# Patient Record
Sex: MALE | Race: WHITE | URBAN - METROPOLITAN AREA
[De-identification: names, ages, dates, MRNs, and addresses within clinical notes are randomized per-mention and may not be internally consistent; named-entity substitution may affect disease eponyms.]

---

## 2020-02-01 ENCOUNTER — OUTPATIENT (OUTPATIENT)
Dept: OUTPATIENT SERVICES | Facility: HOSPITAL | Age: 60
LOS: 1 days | End: 2020-02-01
Payer: MEDICARE

## 2020-02-01 PROCEDURE — G9005: CPT

## 2020-02-05 ENCOUNTER — EMERGENCY (EMERGENCY)
Facility: HOSPITAL | Age: 60
LOS: 0 days | Discharge: HOME | End: 2020-02-05
Attending: EMERGENCY MEDICINE
Payer: MEDICARE

## 2020-02-05 VITALS
OXYGEN SATURATION: 98 % | HEART RATE: 89 BPM | RESPIRATION RATE: 17 BRPM | DIASTOLIC BLOOD PRESSURE: 90 MMHG | TEMPERATURE: 98 F | SYSTOLIC BLOOD PRESSURE: 145 MMHG

## 2020-02-05 VITALS
SYSTOLIC BLOOD PRESSURE: 168 MMHG | RESPIRATION RATE: 18 BRPM | WEIGHT: 257.06 LBS | DIASTOLIC BLOOD PRESSURE: 108 MMHG | HEART RATE: 106 BPM | TEMPERATURE: 98 F | OXYGEN SATURATION: 97 %

## 2020-02-05 DIAGNOSIS — R45.851 SUICIDAL IDEATIONS: ICD-10-CM

## 2020-02-05 DIAGNOSIS — F43.29 ADJUSTMENT DISORDER WITH OTHER SYMPTOMS: ICD-10-CM

## 2020-02-05 DIAGNOSIS — F25.9 SCHIZOAFFECTIVE DISORDER, UNSPECIFIED: ICD-10-CM

## 2020-02-05 DIAGNOSIS — I10 ESSENTIAL (PRIMARY) HYPERTENSION: ICD-10-CM

## 2020-02-05 DIAGNOSIS — E11.65 TYPE 2 DIABETES MELLITUS WITH HYPERGLYCEMIA: ICD-10-CM

## 2020-02-05 LAB
ALBUMIN SERPL ELPH-MCNC: 3.2 G/DL — LOW (ref 3.3–5)
ALP SERPL-CCNC: 77 U/L — SIGNIFICANT CHANGE UP (ref 40–120)
ALT FLD-CCNC: 29 U/L — SIGNIFICANT CHANGE UP (ref 12–78)
ANION GAP SERPL CALC-SCNC: 9 MMOL/L — SIGNIFICANT CHANGE UP (ref 5–17)
APAP SERPL-MCNC: SIGNIFICANT CHANGE UP UG/ML (ref 10–30)
AST SERPL-CCNC: 24 U/L — SIGNIFICANT CHANGE UP (ref 15–37)
BASOPHILS # BLD AUTO: 0.09 K/UL — SIGNIFICANT CHANGE UP (ref 0–0.2)
BASOPHILS NFR BLD AUTO: 1.3 % — SIGNIFICANT CHANGE UP (ref 0–2)
BILIRUB SERPL-MCNC: 0.4 MG/DL — SIGNIFICANT CHANGE UP (ref 0.2–1.2)
BUN SERPL-MCNC: 14 MG/DL — SIGNIFICANT CHANGE UP (ref 7–23)
CALCIUM SERPL-MCNC: 9.2 MG/DL — SIGNIFICANT CHANGE UP (ref 8.5–10.1)
CHLORIDE SERPL-SCNC: 99 MMOL/L — SIGNIFICANT CHANGE UP (ref 96–108)
CO2 SERPL-SCNC: 25 MMOL/L — SIGNIFICANT CHANGE UP (ref 22–31)
CREAT SERPL-MCNC: 1.2 MG/DL — SIGNIFICANT CHANGE UP (ref 0.5–1.3)
EOSINOPHIL # BLD AUTO: 0.5 K/UL — SIGNIFICANT CHANGE UP (ref 0–0.5)
EOSINOPHIL NFR BLD AUTO: 7.3 % — HIGH (ref 0–6)
ETHANOL SERPL-MCNC: <10 MG/DL — SIGNIFICANT CHANGE UP (ref 0–10)
GLUCOSE SERPL-MCNC: 456 MG/DL — CRITICAL HIGH (ref 70–99)
HCO3 BLDV-SCNC: 25 MMOL/L — SIGNIFICANT CHANGE UP (ref 21–29)
HCT VFR BLD CALC: 38.5 % — LOW (ref 39–50)
HGB BLD-MCNC: 13 G/DL — SIGNIFICANT CHANGE UP (ref 13–17)
IMM GRANULOCYTES NFR BLD AUTO: 0.7 % — SIGNIFICANT CHANGE UP (ref 0–1.5)
LYMPHOCYTES # BLD AUTO: 0.95 K/UL — LOW (ref 1–3.3)
LYMPHOCYTES # BLD AUTO: 14 % — SIGNIFICANT CHANGE UP (ref 13–44)
MCHC RBC-ENTMCNC: 28.5 PG — SIGNIFICANT CHANGE UP (ref 27–34)
MCHC RBC-ENTMCNC: 33.8 GM/DL — SIGNIFICANT CHANGE UP (ref 32–36)
MCV RBC AUTO: 84.4 FL — SIGNIFICANT CHANGE UP (ref 80–100)
MONOCYTES # BLD AUTO: 0.46 K/UL — SIGNIFICANT CHANGE UP (ref 0–0.9)
MONOCYTES NFR BLD AUTO: 6.8 % — SIGNIFICANT CHANGE UP (ref 2–14)
NEUTROPHILS # BLD AUTO: 4.76 K/UL — SIGNIFICANT CHANGE UP (ref 1.8–7.4)
NEUTROPHILS NFR BLD AUTO: 69.9 % — SIGNIFICANT CHANGE UP (ref 43–77)
PCO2 BLDV: 47 MMHG — SIGNIFICANT CHANGE UP (ref 35–50)
PCP SPEC-MCNC: SIGNIFICANT CHANGE UP
PH BLDV: 7.36 — SIGNIFICANT CHANGE UP (ref 7.35–7.45)
PLATELET # BLD AUTO: 210 K/UL — SIGNIFICANT CHANGE UP (ref 150–400)
PO2 BLDV: 57 MMHG — HIGH (ref 25–45)
POTASSIUM SERPL-MCNC: 4.1 MMOL/L — SIGNIFICANT CHANGE UP (ref 3.5–5.3)
POTASSIUM SERPL-SCNC: 4.1 MMOL/L — SIGNIFICANT CHANGE UP (ref 3.5–5.3)
PROT SERPL-MCNC: 7.5 GM/DL — SIGNIFICANT CHANGE UP (ref 6–8.3)
RBC # BLD: 4.56 M/UL — SIGNIFICANT CHANGE UP (ref 4.2–5.8)
RBC # FLD: 12.8 % — SIGNIFICANT CHANGE UP (ref 10.3–14.5)
SALICYLATES SERPL-MCNC: 3.1 MG/DL — SIGNIFICANT CHANGE UP (ref 2.8–20)
SAO2 % BLDV: 87 % — SIGNIFICANT CHANGE UP (ref 67–88)
SODIUM SERPL-SCNC: 133 MMOL/L — LOW (ref 135–145)
WBC # BLD: 6.81 K/UL — SIGNIFICANT CHANGE UP (ref 3.8–10.5)
WBC # FLD AUTO: 6.81 K/UL — SIGNIFICANT CHANGE UP (ref 3.8–10.5)

## 2020-02-05 PROCEDURE — 80307 DRUG TEST PRSMV CHEM ANLYZR: CPT

## 2020-02-05 PROCEDURE — 90792 PSYCH DIAG EVAL W/MED SRVCS: CPT

## 2020-02-05 PROCEDURE — 82962 GLUCOSE BLOOD TEST: CPT

## 2020-02-05 PROCEDURE — 85025 COMPLETE CBC W/AUTO DIFF WBC: CPT

## 2020-02-05 PROCEDURE — 93005 ELECTROCARDIOGRAM TRACING: CPT

## 2020-02-05 PROCEDURE — 36415 COLL VENOUS BLD VENIPUNCTURE: CPT

## 2020-02-05 PROCEDURE — 93010 ELECTROCARDIOGRAM REPORT: CPT

## 2020-02-05 PROCEDURE — 82803 BLOOD GASES ANY COMBINATION: CPT

## 2020-02-05 PROCEDURE — 99285 EMERGENCY DEPT VISIT HI MDM: CPT

## 2020-02-05 PROCEDURE — 80053 COMPREHEN METABOLIC PANEL: CPT

## 2020-02-05 PROCEDURE — 99283 EMERGENCY DEPT VISIT LOW MDM: CPT

## 2020-02-05 RX ORDER — SODIUM CHLORIDE 9 MG/ML
2000 INJECTION INTRAMUSCULAR; INTRAVENOUS; SUBCUTANEOUS ONCE
Refills: 0 | Status: COMPLETED | OUTPATIENT
Start: 2020-02-05 | End: 2020-02-05

## 2020-02-05 RX ORDER — INSULIN LISPRO 100/ML
6 VIAL (ML) SUBCUTANEOUS ONCE
Refills: 0 | Status: COMPLETED | OUTPATIENT
Start: 2020-02-05 | End: 2020-02-05

## 2020-02-05 RX ADMIN — Medication 6 UNIT(S): at 12:27

## 2020-02-05 RX ADMIN — SODIUM CHLORIDE 2000 MILLILITER(S): 9 INJECTION INTRAMUSCULAR; INTRAVENOUS; SUBCUTANEOUS at 11:00

## 2020-02-05 NOTE — ED PROVIDER NOTE - PATIENT PORTAL LINK FT
You can access the FollowMyHealth Patient Portal offered by [facility] by registering at the following website: [email or web address]. By joining [employer]’s FollowMyHealth portal, you will also be able to view your health information using other applications (apps) compatible with our system.

## 2020-02-05 NOTE — ED BEHAVIORAL HEALTH ASSESSMENT NOTE - SUICIDE PROTECTIVE FACTORS
Supportive social network of family or friends/Identifies reasons for living/Responsibility to family and others/Has future plans

## 2020-02-05 NOTE — ED BEHAVIORAL HEALTH ASSESSMENT NOTE - DIFFERENTIAL
schizoaffective, acute exacerbation v Major Depressive Disorder v Adjustment reaction with depressed mood
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
## 2020-02-05 NOTE — ED PROVIDER NOTE - CLINICAL SUMMARY MEDICAL DECISION MAKING FREE TEXT BOX
Pt here for SI, incidentally found to have finger stick in 400s. Plan: fluids, labs, r/o DKA, medically clear, psych consult.

## 2020-02-05 NOTE — ED BEHAVIORAL HEALTH NOTE - BEHAVIORAL HEALTH NOTE
After being informed by DION Molina that pt wanted to discuss outpt options with SW, SW met with pt at bedside. Per pt, his psychiatrist is retiring and he has approx. 3 weeks of BH meds left. SW discussed [facility] as option, and pt agreeable. Pt verbally consented to referral to [facility] for medication management and therapeutic services. SW also provided [facility] contact info as resource in the event that pt runs out of meds prior to seeing psychiatrist at [facility]. After obtaining verbal consent, SW made referral via fax. Appt made using [facility] for 2/11 at 1:30PM with Brenda Rhoades for intake. DION molina updated. MD updated. Np additional SW needs verbalized by pt at this time.

## 2020-02-05 NOTE — ED BEHAVIORAL HEALTH ASSESSMENT NOTE - DESCRIPTION
Patient was calm and cooperative in the ED and did not exhibit any aggression. Patient did not require any PRN medications or any physical restraints.    Vital Signs Last 24 Hrs  T(C): 36.6 (05 Feb 2020 10:18), Max: 36.6 (05 Feb 2020 10:18)  T(F): 97.8 (05 Feb 2020 10:18), Max: 97.8 (05 Feb 2020 10:18)  HR: 106 (05 Feb 2020 10:18) (106 - 106)  BP: 168/108 (05 Feb 2020 10:18) (168/108 - 168/108)  BP(mean): --  RR: 18 (05 Feb 2020 10:18) (18 - 18)  SpO2: 97% (05 Feb 2020 10:18) (97% - 97%) See ED Attending / Provider note As per HPI

## 2020-02-05 NOTE — ED BEHAVIORAL HEALTH ASSESSMENT NOTE - RISK ASSESSMENT
LOW RISK     ACUTE RISK FACTORS: interpersonal conflict with roommate, irritable,    CHRONIC RISK FACTORS: history of self-injurious behaviors, schizoaffective disorder, history of in-patient hospitalization,     PROTECTIVE FACTORS: no suicidal ideation/intent/plan or assault ideation/intent/plan or homicidal ideation/intent/plan , future oriented, engaged in safety planning     Patient symptoms not indicating imminent risk for harm to self; not warranting involuntary in-patient hospitalization Low Acute Suicide Risk

## 2020-02-05 NOTE — ED PROVIDER NOTE - NSFOLLOWUPINSTRUCTIONS_ED_ALL_ED_FT
Suicidal Feelings: How to Help Yourself  Suicide is when you end your own life. There are many things you can do to help yourself feel better when struggling with these feelings. Many services and people are available to support you and others who struggle with similar feelings.   If you ever feel like you may hurt yourself or others, or have thoughts about taking your own life, get help right away. To get help:   Call your local emergency services (911 in the [place] Go to your nearest emergency department. Call a suicide hotline to speak with a trained counselor. The following suicide hotlines are available in the [place]:  9-979-949-TALK (1-109.531.3135).9-180-TGPLCAI (1-174.986.1322).1-392.387.2154. This is a hotline for Cypriot speakers.1-326.613.9084. This is a hotline for TTY users.7-192-1-U-PARMINDER (1-557.537.1544). This is a hotline for lesbian, rincon, bisexual, transgender, or questioning youth.For a list of hotlines in [place], visit www.suicide.org/hotlines/international/gqgrgs-mhitvzw-qxcqkyvp.htmlContact a crisis center or a local suicide prevention center. To find a crisis center or suicide prevention center:  Call your local hospital, clinic, community service organization, mental health center, social service provider, or health department. Ask for help with connecting to a crisis center.For a list of crisis centers in the [place], visit: suicidepreventionlifeline.orgFor a list of crisis centers in [place], visit: suicideprevention.caHow to help yourself feel better     Promise yourself that you will not do anything extreme when you have suicidal feelings. Remember, there is hope. Many people have gotten through suicidal thoughts and feelings, and you can too. If you have had these feelings before, remind yourself that you can get through them again.Let family, friends, teachers, or counselors know how you are feeling. Try not to separate yourself from those who care about you and want to help you. Talk with someone every day, even if you do not feel sociable. Face-to-face conversation is best to help them understand your feelings.Contact a mental health care provider and work with this person regularly.Make a safety plan that you can follow during a crisis. Include phone numbers of suicide prevention hotlines, mental health professionals, and trusted friends and family members you can call during an emergency. Save these numbers on your phone.If you are thinking of taking a lot of medicine, give your medicine to someone who can give it to you as prescribed. If you are on antidepressants and are concerned you will overdose, tell your health care provider so that he or she can give you safer medicines.Try to stick to your routines. Follow a schedule every day. Make self-care a priority.Make a list of realistic goals, and cross them off when you achieve them. Accomplishments can give you a sense of worth.Wait until you are feeling better before doing things that you find difficult or unpleasant.Do things that you have always enjoyed to take your mind off your feelings. Try reading a book, or listening to or playing music. Spending time outside, in nature, may help you feel better.Follow these instructions at home:     Visit your primary health care provider every year for a checkup.Work with a mental health care provider as needed.Eat a well-balanced diet, and eat regular meals.Get plenty of rest.Exercise if you are able. Just 30 minutes of exercise each day can help you feel better.Take over-the-counter and prescription medicines only as told by your health care provider. Ask your mental health care provider about the possible side effects of any medicines you are taking.Do not use alcohol or drugs, and remove these substances from your home.Remove weapons, poisons, knives, and other deadly items from your home.General recommendations  Keep your living space well lit.When you are feeling well, write yourself a letter with tips and support that you can read when you are not feeling well.Remember that life's difficulties can be sorted out with help. Conditions can be treated, and you can learn behaviors and ways of thinking that will help you.Where to find more information  National Suicide Prevention Lifeline: www.suicidepreventionlifeline.orgHopeline: www.hopeline.[facility] Foundation for Suicide Prevention: www.afsp.orgThe Parminder Project (for lesbian, rincon, bisexual, transgender, or questioning youth): www.thetrevorproject.orgContact a health care provider if:  You feel as though you are a burden to others.You feel agitated, angry, vengeful, or have extreme mood swings.You have withdrawn from family and friends.Get help right away if:  You are talking about suicide or wishing to die.You start making plans for how to commit suicide.You feel that you have no reason to live.You start making plans for putting your affairs in order, saying goodbye, or giving your possessions away.You feel guilt, shame, or unbearable pain, and it seems like there is no way out.You are frequently using drugs or alcohol.You are engaging in risky behaviors that could lead to death.If you have any of these symptoms, get help right away. Call emergency services, go to your nearest emergency department or crisis center, or call a suicide crisis helpline.   Summary  Suicide is when you take your own life.Promise yourself that you will not do anything extreme when you have suicidal feelings.Let family, friends, teachers, or counselors know how you are feeling.Get help right away if you feel as though life is getting too tough to handle and you are thinking about suicide.This information is not intended to replace advice given to you by your health care provider. Make sure you discuss any questions you have with your health care provider.    Document Released: 06/23/2004 Document Revised: 07/31/2018 Document Reviewed: 07/31/2018  [employer] Patient Education © 2019 [employer]

## 2020-02-05 NOTE — ED ADULT NURSE REASSESSMENT NOTE - NS ED NURSE REASSESS COMMENT FT1
Pt cleared by psychiatric NP Lonnie, pt awaiting social work consult. Pt medicated with insulin. Will continue to monitor for safety and comfort.
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
## 2020-02-05 NOTE — ED ADULT NURSE NOTE - OBJECTIVE STATEMENT
58 y/o M presents to the ED c/ SI, depression due to living situation. Pt states his roommate is very negative. Pt BIB [facility], calm and co-operative. No complaints of pain or discomfort.

## 2020-02-16 ENCOUNTER — INPATIENT (INPATIENT)
Facility: HOSPITAL | Age: 60
LOS: 7 days | Discharge: HOME | DRG: 885 | End: 2020-02-24
Attending: PSYCHIATRY & NEUROLOGY | Admitting: PSYCHIATRY & NEUROLOGY
Payer: MEDICARE

## 2020-02-16 VITALS
SYSTOLIC BLOOD PRESSURE: 158 MMHG | WEIGHT: 259.93 LBS | RESPIRATION RATE: 16 BRPM | HEIGHT: 73 IN | TEMPERATURE: 98 F | DIASTOLIC BLOOD PRESSURE: 86 MMHG

## 2020-02-16 DIAGNOSIS — R45.850 HOMICIDAL IDEATIONS: ICD-10-CM

## 2020-02-16 DIAGNOSIS — F25.0 SCHIZOAFFECTIVE DISORDER, BIPOLAR TYPE: ICD-10-CM

## 2020-02-16 PROBLEM — E11.9 TYPE 2 DIABETES MELLITUS WITHOUT COMPLICATIONS: Chronic | Status: ACTIVE | Noted: 2020-02-05

## 2020-02-16 PROBLEM — I10 ESSENTIAL (PRIMARY) HYPERTENSION: Chronic | Status: ACTIVE | Noted: 2020-02-05

## 2020-02-16 PROBLEM — F25.9 SCHIZOAFFECTIVE DISORDER, UNSPECIFIED: Chronic | Status: ACTIVE | Noted: 2020-02-05

## 2020-02-16 LAB
ALBUMIN SERPL ELPH-MCNC: 3.4 G/DL — SIGNIFICANT CHANGE UP (ref 3.3–5)
ALP SERPL-CCNC: 74 U/L — SIGNIFICANT CHANGE UP (ref 40–120)
ALT FLD-CCNC: 28 U/L — SIGNIFICANT CHANGE UP (ref 12–78)
ANION GAP SERPL CALC-SCNC: 6 MMOL/L — SIGNIFICANT CHANGE UP (ref 5–17)
APAP SERPL-MCNC: < 2 UG/ML (ref 10–30)
APPEARANCE UR: CLEAR — SIGNIFICANT CHANGE UP
AST SERPL-CCNC: 19 U/L — SIGNIFICANT CHANGE UP (ref 15–37)
BASOPHILS # BLD AUTO: 0.11 K/UL — SIGNIFICANT CHANGE UP (ref 0–0.2)
BASOPHILS NFR BLD AUTO: 1.5 % — SIGNIFICANT CHANGE UP (ref 0–2)
BILIRUB SERPL-MCNC: 0.5 MG/DL — SIGNIFICANT CHANGE UP (ref 0.2–1.2)
BILIRUB UR-MCNC: NEGATIVE — SIGNIFICANT CHANGE UP
BUN SERPL-MCNC: 16 MG/DL — SIGNIFICANT CHANGE UP (ref 7–23)
CALCIUM SERPL-MCNC: 8.7 MG/DL — SIGNIFICANT CHANGE UP (ref 8.5–10.1)
CHLORIDE SERPL-SCNC: 100 MMOL/L — SIGNIFICANT CHANGE UP (ref 96–108)
CO2 SERPL-SCNC: 25 MMOL/L — SIGNIFICANT CHANGE UP (ref 22–31)
COLOR SPEC: YELLOW — SIGNIFICANT CHANGE UP
CREAT SERPL-MCNC: 1.08 MG/DL — SIGNIFICANT CHANGE UP (ref 0.5–1.3)
DIFF PNL FLD: ABNORMAL
EOSINOPHIL # BLD AUTO: 0.6 K/UL — HIGH (ref 0–0.5)
EOSINOPHIL NFR BLD AUTO: 8 % — HIGH (ref 0–6)
ETHANOL SERPL-MCNC: <10 MG/DL — SIGNIFICANT CHANGE UP (ref 0–10)
GLUCOSE SERPL-MCNC: 400 MG/DL — HIGH (ref 70–99)
GLUCOSE UR QL: 1000 MG/DL
HCT VFR BLD CALC: 40.3 % — SIGNIFICANT CHANGE UP (ref 39–50)
HGB BLD-MCNC: 13.7 G/DL — SIGNIFICANT CHANGE UP (ref 13–17)
IMM GRANULOCYTES NFR BLD AUTO: 0.8 % — SIGNIFICANT CHANGE UP (ref 0–1.5)
KETONES UR-MCNC: NEGATIVE — SIGNIFICANT CHANGE UP
LEUKOCYTE ESTERASE UR-ACNC: NEGATIVE — SIGNIFICANT CHANGE UP
LIDOCAIN IGE QN: 170 U/L — SIGNIFICANT CHANGE UP (ref 73–393)
LYMPHOCYTES # BLD AUTO: 0.94 K/UL — LOW (ref 1–3.3)
LYMPHOCYTES # BLD AUTO: 12.6 % — LOW (ref 13–44)
MAGNESIUM SERPL-MCNC: 2.1 MG/DL — SIGNIFICANT CHANGE UP (ref 1.6–2.6)
MCHC RBC-ENTMCNC: 28.8 PG — SIGNIFICANT CHANGE UP (ref 27–34)
MCHC RBC-ENTMCNC: 34 GM/DL — SIGNIFICANT CHANGE UP (ref 32–36)
MCV RBC AUTO: 84.8 FL — SIGNIFICANT CHANGE UP (ref 80–100)
MONOCYTES # BLD AUTO: 0.65 K/UL — SIGNIFICANT CHANGE UP (ref 0–0.9)
MONOCYTES NFR BLD AUTO: 8.7 % — SIGNIFICANT CHANGE UP (ref 2–14)
NEUTROPHILS # BLD AUTO: 5.12 K/UL — SIGNIFICANT CHANGE UP (ref 1.8–7.4)
NEUTROPHILS NFR BLD AUTO: 68.4 % — SIGNIFICANT CHANGE UP (ref 43–77)
NITRITE UR-MCNC: NEGATIVE — SIGNIFICANT CHANGE UP
NT-PROBNP SERPL-SCNC: 50 PG/ML — SIGNIFICANT CHANGE UP (ref 0–125)
PH UR: 6 — SIGNIFICANT CHANGE UP (ref 5–8)
PLATELET # BLD AUTO: 241 K/UL — SIGNIFICANT CHANGE UP (ref 150–400)
POTASSIUM SERPL-MCNC: 4.8 MMOL/L — SIGNIFICANT CHANGE UP (ref 3.5–5.3)
POTASSIUM SERPL-SCNC: 4.8 MMOL/L — SIGNIFICANT CHANGE UP (ref 3.5–5.3)
PROT SERPL-MCNC: 7.5 GM/DL — SIGNIFICANT CHANGE UP (ref 6–8.3)
PROT UR-MCNC: 100 MG/DL
RBC # BLD: 4.75 M/UL — SIGNIFICANT CHANGE UP (ref 4.2–5.8)
RBC # FLD: 12.9 % — SIGNIFICANT CHANGE UP (ref 10.3–14.5)
SALICYLATES SERPL-MCNC: <1.7 MG/DL — LOW (ref 2.8–20)
SODIUM SERPL-SCNC: 131 MMOL/L — LOW (ref 135–145)
SP GR SPEC: 1.01 — SIGNIFICANT CHANGE UP (ref 1.01–1.02)
TROPONIN I SERPL-MCNC: <0.015 NG/ML — SIGNIFICANT CHANGE UP (ref 0.01–0.04)
TROPONIN I SERPL-MCNC: <0.015 NG/ML — SIGNIFICANT CHANGE UP (ref 0.01–0.04)
UROBILINOGEN FLD QL: NEGATIVE MG/DL — SIGNIFICANT CHANGE UP
WBC # BLD: 7.48 K/UL — SIGNIFICANT CHANGE UP (ref 3.8–10.5)
WBC # FLD AUTO: 7.48 K/UL — SIGNIFICANT CHANGE UP (ref 3.8–10.5)

## 2020-02-16 PROCEDURE — 83036 HEMOGLOBIN GLYCOSYLATED A1C: CPT

## 2020-02-16 PROCEDURE — 97162 PT EVAL MOD COMPLEX 30 MIN: CPT | Mod: GP

## 2020-02-16 PROCEDURE — 80061 LIPID PANEL: CPT

## 2020-02-16 PROCEDURE — 36415 COLL VENOUS BLD VENIPUNCTURE: CPT

## 2020-02-16 PROCEDURE — 97116 GAIT TRAINING THERAPY: CPT | Mod: GP

## 2020-02-16 PROCEDURE — 80048 BASIC METABOLIC PNL TOTAL CA: CPT

## 2020-02-16 PROCEDURE — 99221 1ST HOSP IP/OBS SF/LOW 40: CPT

## 2020-02-16 PROCEDURE — 93010 ELECTROCARDIOGRAM REPORT: CPT

## 2020-02-16 PROCEDURE — 82962 GLUCOSE BLOOD TEST: CPT

## 2020-02-16 PROCEDURE — 71045 X-RAY EXAM CHEST 1 VIEW: CPT | Mod: 26

## 2020-02-16 RX ORDER — HALOPERIDOL DECANOATE 100 MG/ML
5 INJECTION INTRAMUSCULAR EVERY 6 HOURS
Refills: 0 | Status: DISCONTINUED | OUTPATIENT
Start: 2020-02-16 | End: 2020-02-24

## 2020-02-16 RX ORDER — DEXTROSE 50 % IN WATER 50 %
15 SYRINGE (ML) INTRAVENOUS ONCE
Refills: 0 | Status: DISCONTINUED | OUTPATIENT
Start: 2020-02-16 | End: 2020-02-24

## 2020-02-16 RX ORDER — ATORVASTATIN CALCIUM 80 MG/1
40 TABLET, FILM COATED ORAL AT BEDTIME
Refills: 0 | Status: DISCONTINUED | OUTPATIENT
Start: 2020-02-16 | End: 2020-02-24

## 2020-02-16 RX ORDER — DEXTROSE 50 % IN WATER 50 %
25 SYRINGE (ML) INTRAVENOUS ONCE
Refills: 0 | Status: DISCONTINUED | OUTPATIENT
Start: 2020-02-16 | End: 2020-02-24

## 2020-02-16 RX ORDER — DEXTROSE 50 % IN WATER 50 %
12.5 SYRINGE (ML) INTRAVENOUS ONCE
Refills: 0 | Status: DISCONTINUED | OUTPATIENT
Start: 2020-02-16 | End: 2020-02-24

## 2020-02-16 RX ORDER — DIPHENHYDRAMINE HCL 50 MG
25 CAPSULE ORAL EVERY 6 HOURS
Refills: 0 | Status: DISCONTINUED | OUTPATIENT
Start: 2020-02-16 | End: 2020-02-24

## 2020-02-16 RX ORDER — VENLAFAXINE HCL 75 MG
150 CAPSULE, EXT RELEASE 24 HR ORAL DAILY
Refills: 0 | Status: DISCONTINUED | OUTPATIENT
Start: 2020-02-16 | End: 2020-02-24

## 2020-02-16 RX ORDER — GLUCAGON INJECTION, SOLUTION 0.5 MG/.1ML
1 INJECTION, SOLUTION SUBCUTANEOUS ONCE
Refills: 0 | Status: DISCONTINUED | OUTPATIENT
Start: 2020-02-16 | End: 2020-02-24

## 2020-02-16 RX ORDER — OLANZAPINE 15 MG/1
10 TABLET, FILM COATED ORAL AT BEDTIME
Refills: 0 | Status: DISCONTINUED | OUTPATIENT
Start: 2020-02-16 | End: 2020-02-17

## 2020-02-16 RX ORDER — INSULIN GLARGINE 100 [IU]/ML
80 INJECTION, SOLUTION SUBCUTANEOUS AT BEDTIME
Refills: 0 | Status: DISCONTINUED | OUTPATIENT
Start: 2020-02-16 | End: 2020-02-18

## 2020-02-16 RX ORDER — SODIUM CHLORIDE 9 MG/ML
1000 INJECTION, SOLUTION INTRAVENOUS
Refills: 0 | Status: DISCONTINUED | OUTPATIENT
Start: 2020-02-16 | End: 2020-02-24

## 2020-02-16 RX ORDER — METOPROLOL TARTRATE 50 MG
25 TABLET ORAL
Refills: 0 | Status: DISCONTINUED | OUTPATIENT
Start: 2020-02-16 | End: 2020-02-24

## 2020-02-16 RX ORDER — INSULIN LISPRO 100/ML
VIAL (ML) SUBCUTANEOUS
Refills: 0 | Status: DISCONTINUED | OUTPATIENT
Start: 2020-02-16 | End: 2020-02-24

## 2020-02-16 RX ORDER — INSULIN LISPRO 100/ML
VIAL (ML) SUBCUTANEOUS AT BEDTIME
Refills: 0 | Status: DISCONTINUED | OUTPATIENT
Start: 2020-02-16 | End: 2020-02-24

## 2020-02-16 RX ORDER — GABAPENTIN 400 MG/1
100 CAPSULE ORAL THREE TIMES A DAY
Refills: 0 | Status: DISCONTINUED | OUTPATIENT
Start: 2020-02-16 | End: 2020-02-24

## 2020-02-16 RX ORDER — ASPIRIN/CALCIUM CARB/MAGNESIUM 324 MG
324 TABLET ORAL ONCE
Refills: 0 | Status: COMPLETED | OUTPATIENT
Start: 2020-02-16 | End: 2020-02-16

## 2020-02-16 RX ADMIN — Medication 25 MILLIGRAM(S): at 21:49

## 2020-02-16 RX ADMIN — Medication 4: at 21:47

## 2020-02-16 RX ADMIN — ATORVASTATIN CALCIUM 40 MILLIGRAM(S): 80 TABLET, FILM COATED ORAL at 21:49

## 2020-02-16 RX ADMIN — Medication 324 MILLIGRAM(S): at 09:36

## 2020-02-16 RX ADMIN — OLANZAPINE 10 MILLIGRAM(S): 15 TABLET, FILM COATED ORAL at 21:49

## 2020-02-16 RX ADMIN — GABAPENTIN 100 MILLIGRAM(S): 400 CAPSULE ORAL at 21:49

## 2020-02-16 RX ADMIN — INSULIN GLARGINE 80 UNIT(S): 100 INJECTION, SOLUTION SUBCUTANEOUS at 21:46

## 2020-02-16 NOTE — ED PROVIDER NOTE - NS ED ROS FT
Constitutional: No fever or chills  Eyes: No visual changes  HEENT: No throat pain  CV: + chest pain  Resp: No SOB no cough  GI: No abd pain, nausea or vomiting  : No dysuria  MSK: No musculoskeletal pain  Skin: No rash  Neuro: No headache   psych: SI/HI

## 2020-02-16 NOTE — ED ADULT NURSE NOTE - CHPI ED NUR SYMPTOMS NEG
no vomiting/no shortness of breath/no congestion/no dizziness/no syncope/no diaphoresis/no back pain/no fever/no chills/no nausea
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
## 2020-02-16 NOTE — ED PROVIDER NOTE - PHYSICAL EXAMINATION
Constitutional: NAD AAOx3  Eyes: PERRLA EOMI  Head: Normocephalic atraumatic  Mouth: MMM  Cardiac: regular rate   Resp: Lungs CTAB  GI: Abd s/nt/nd  Neuro: CN2-12 intact  Skin: No rashes   psych: SI HI

## 2020-02-16 NOTE — ED BEHAVIORAL HEALTH ASSESSMENT NOTE - RISK ASSESSMENT
HIGH RISK:     ACUTE RISK FACTORS: interpersonal conflict with roommate, irritable, current homicidal ideations with a plan to buy a gun to shoot a peer. Unable  to engaged in safety planning.    CHRONIC RISK FACTORS: history of self-injurious behaviors, schizoaffective disorder, history of in-patient hospitalization. Not future oriented, current hopelessness.    PROTECTIVE FACTORS: no current suicidal ideation/intent/plan.     Patient symptoms are indicative of imminent risk for harm his peer; warranting involuntary in-patient hospitalization. High Acute Suicide Risk HIGH RISK:   ACUTE RISK FACTORS: interpersonal conflict with roommate, irritable, current homicidal ideations with a plan to buy a gun to shoot a peer. Unable  to engaged in safety planning.    CHRONIC RISK FACTORS: history of self-injurious behaviors, schizoaffective disorder, history of in-patient hospitalization. Not future oriented, current hopelessness.    PROTECTIVE FACTORS: no current suicidal ideation/intent/plan.     Patient symptoms are indicative of imminent risk for harm his peer; warranting involuntary in-patient hospitalization.
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
## 2020-02-16 NOTE — CONSULT NOTE ADULT - ASSESSMENT
59M hx htn hld dm cad schizoaffective disorder presents to the ED for chest pressure on and off for the past few days.troponin x2 negative in ED, patient was medically cleared By ED for psych admission    # 59M hx htn hld dm cad schizoaffective disorder presents to the ED for chest pressure on and off for the past few days.troponin x2 negative in ED, patient was medically cleared By ED for psych admission    #Schizoaffective disorder /suical ideation  management per psych     #CAD  s/patypical  CP likely anxiety related /unlikley cardiac etiology  EKG NSR RBBB unchanged since feb 5th 2020, troponinx2 negative, no cardiac symptoms at this time   continue asa, (crestor not on formulary), start atorvastatin instead  continue metoprolol    # hx HTN controlled  # hx HLD  continue home meds as above    #hx DM uncontrolled FSBG 400  Not in DKA  resume lantus 80units home dose  insulin sliding scale     # DVT prophylaxis  ambulation    thank you for the courtesy of this consult

## 2020-02-16 NOTE — ED BEHAVIORAL HEALTH ASSESSMENT NOTE - DESCRIPTION
Calm and cooperative in the ED; no aggressive outbursts noted or reported by ED staff. No PRN medications or physical restraints required.    Vital Signs Last 24 Hrs  T(C): 36.8 (16 Feb 2020 08:16), Max: 36.8 (16 Feb 2020 08:16)  T(F): 98.2 (16 Feb 2020 08:16), Max: 98.2 (16 Feb 2020 08:16)  HR: --  BP: 158/86 (16 Feb 2020 08:16) (158/86 - 158/86)  BP(mean): --  RR: 16 (16 Feb 2020 08:16) (16 - 16)  SpO2: -- See ED Attending / Provider note As per HPI
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
## 2020-02-16 NOTE — ED ADULT NURSE REASSESSMENT NOTE - NS ED NURSE REASSESS COMMENT FT1
pt. stated to PCA that he had thoughts of cutting his wrists, DION Stinson made aware, report given to 5N, safety maintained.

## 2020-02-16 NOTE — PATIENT PROFILE BEHAVIORAL HEALTH - VISION (WITH CORRECTIVE LENSES IF THE PATIENT USUALLY WEARS THEM):
use glasses for reading./Normal vision: sees adequately in most situations; can see medication labels, newsprint use glasses for reading. (not prescription)/Partially impaired: cannot see medication labels or newsprint, but can see obstacles in path, and the surrounding layout; can count fingers at arm's length

## 2020-02-16 NOTE — CONSULT NOTE ADULT - SUBJECTIVE AND OBJECTIVE BOX
59M hx htn hld dm cad schizoaffective disorder presents to the ED for chest pressure on and off for the past few days. substernal comes and goes non-radiating. no f/c/sob/abd pain/n/v/diarrhea. non-exertional. no sweating.  . Pt also  states that his roommate has been bothering him lately - this has been making him agitated and over the past few days started having chest pressure. pt also stated he has been having suicidal ideations. Patient was medically cleared By ED for psych admission  patient admitted to psych for schizoaffective disorder and for concern of  harm to self or other    patient at this time denies any CP or SOB or abd pain or nausea or vomiting, no fever or cough or diarrhea     Pmhx see HPI  pshx none  home meds reveiwed  social hx denies recereational drug use , nonsmoker  family hx      Constitutional: NAD AAOx3  	Eyes: PERRLA EOMI  	Head: Normocephalic atraumatic  	Mouth: MMM  	Cardiac: regular rate   	Resp: Lungs CTAB  	GI: Abd s/nt/nd  	Neuro: CN2-12 intact  	Skin: No rashes 59M hx htn hld dm cad schizoaffective disorder presents to the ED for chest pressure on and off for the past few days. substernal comes and goes non-radiating. no f/c/sob/abd pain/n/v/diarrhea. non-exertional. no sweating.  . Pt also  states that his roommate has been bothering him lately - this has been making him agitated and over the past few days started having chest pressure. pt also stated he has been having suicidal ideations. Patient was medically cleared By ED for psych admission  patient admitted to psych for schizoaffective disorder and for concern of  harm to self or other    patient at this time denies any CP or SOB or abd pain or nausea or vomiting, no fever or cough or diarrhea     Pmhx see HPI  pshx none  home meds reveiwed  social hx denies recereational drug use , nonsmoker  family hx      Constitutional: NAD AAOx3  	Eyes: PERRLA EOMI  	Head: Normocephalic atraumatic  	Mouth: MMM  	Cardiac: regular rate   	Resp: Lungs CTAB  	GI: Abd s/nt/nd  	Neuro: CN2-12 intact  	Skin: No rashes         PHYSICAL EXAM:    Daily Height in cm: 185.42 (2020 16:36)    Daily Weight in k.4 (2020 16:36)    ICU Vital Signs Last 24 Hrs  T(C): 37.2 (2020 16:36), Max: 37.2 (2020 16:36)  T(F): 98.9 (2020 16:36), Max: 98.9 (2020 16:36)  HR: 86 (2020 15:09) (81 - 86)  BP: 142/84 (2020 15:09) (142/84 - 158/86)  BP(mean): 98 (2020 15:09) (98 - 98)  ABP: --  ABP(mean): --  RR: 16 (2020 16:36) (16 - 18)  SpO2: 94% (2020 15:09) (94% - 100%)                              13.7   7.48  )-----------( 241      ( 2020 09:15 )             40.3       CBC Full  -  ( 2020 09:15 )  WBC Count : 7.48 K/uL  RBC Count : 4.75 M/uL  Hemoglobin : 13.7 g/dL  Hematocrit : 40.3 %  Platelet Count - Automated : 241 K/uL  Mean Cell Volume : 84.8 fl  Mean Cell Hemoglobin : 28.8 pg  Mean Cell Hemoglobin Concentration : 34.0 gm/dL  Auto Neutrophil # : 5.12 K/uL  Auto Lymphocyte # : 0.94 K/uL  Auto Monocyte # : 0.65 K/uL  Auto Eosinophil # : 0.60 K/uL  Auto Basophil # : 0.11 K/uL  Auto Neutrophil % : 68.4 %  Auto Lymphocyte % : 12.6 %  Auto Monocyte % : 8.7 %  Auto Eosinophil % : 8.0 %  Auto Basophil % : 1.5 %      16    131<L>  |  100  |  16  ----------------------------<  400<H>  4.8   |  25  |  1.08    Ca    8.7      2020 09:15  Mg     2.1     -    TPro  7.5  /  Alb  3.4  /  TBili  0.5  /  DBili  x   /  AST  19  /  ALT  28  /  AlkPhos  74  02-16      LIVER FUNCTIONS - ( 2020 09:15 )  Alb: 3.4 g/dL / Pro: 7.5 gm/dL / ALK PHOS: 74 U/L / ALT: 28 U/L / AST: 19 U/L / GGT: x                 CARDIAC MARKERS ( 2020 11:44 )  <0.015 ng/mL / x     / x     / x     / x      CARDIAC MARKERS ( 2020 09:15 )  <0.015 ng/mL / x     / x     / x     / x            Urinalysis Basic - ( 2020 09:44 )    Color: Yellow / Appearance: Clear / S.010 / pH: x  Gluc: x / Ketone: Negative  / Bili: Negative / Urobili: Negative mg/dL   Blood: x / Protein: 100 mg/dL / Nitrite: Negative   Leuk Esterase: Negative / RBC: 6-10 /HPF / WBC 0-2   Sq Epi: x / Non Sq Epi: Occasional / Bacteria: Occasional            MEDICATIONS  (STANDING):  atorvastatin 40 milliGRAM(s) Oral at bedtime  gabapentin 100 milliGRAM(s) Oral three times a day  metoprolol succinate ER 25 milliGRAM(s) Oral two times a day  OLANZapine 10 milliGRAM(s) Oral at bedtime  venlafaxine  milliGRAM(s) Oral daily 59M hx htn hld dm cad schizoaffective disorder presents to the ED for chest pressure on and off for the past few days. substernal comes and goes non-radiating. no f/c/sob/abd pain/n/v/diarrhea. non-exertional. no sweating.  . Pt also  states that his roommate has been bothering him lately - this has been making him agitated and over the past few days started having chest pressure. pt also stated he has been having suicidal ideations. Patient was medically cleared By ED for psych admission  patient admitted to psych for schizoaffective disorder and for concern of  harm to self or other    patient at this time denies any CP or SOB or abd pain or nausea or vomiting, no fever or cough or diarrhea     Pmhx see HPI  pshx none  home meds reveiwed  social hx denies recereational drug use , nonsmoker, denies etoh use  family hx- denies family hx of cardiac disease      Constitutional: NAD AAOx3  	Eyes: PERRLA EOMI  	Head: Normocephalic atraumatic  	Mouth: MMM  	Cardiac: regular rate   	Resp: Lungs CTAB  	GI: Abd s/nt/nd  	Neuro: CN2-12 intact  	Skin: No rashes         PHYSICAL EXAM:    Daily Height in cm: 185.42 (2020 16:36)    Daily Weight in k.4 (2020 16:36)    ICU Vital Signs Last 24 Hrs  T(C): 37.2 (2020 16:36), Max: 37.2 (2020 16:36)  T(F): 98.9 (2020 16:36), Max: 98.9 (2020 16:36)  HR: 86 (2020 15:09) (81 - 86)  BP: 142/84 (2020 15:09) (142/84 - 158/86)  BP(mean): 98 (2020 15:09) (98 - 98)  ABP: --  ABP(mean): --  RR: 16 (2020 16:36) (16 - 18)  SpO2: 94% (2020 15:09) (94% - 100%)                              13.7   7.48  )-----------( 241      ( 2020 09:15 )             40.3       CBC Full  -  ( 2020 09:15 )  WBC Count : 7.48 K/uL  RBC Count : 4.75 M/uL  Hemoglobin : 13.7 g/dL  Hematocrit : 40.3 %  Platelet Count - Automated : 241 K/uL  Mean Cell Volume : 84.8 fl  Mean Cell Hemoglobin : 28.8 pg  Mean Cell Hemoglobin Concentration : 34.0 gm/dL  Auto Neutrophil # : 5.12 K/uL  Auto Lymphocyte # : 0.94 K/uL  Auto Monocyte # : 0.65 K/uL  Auto Eosinophil # : 0.60 K/uL  Auto Basophil # : 0.11 K/uL  Auto Neutrophil % : 68.4 %  Auto Lymphocyte % : 12.6 %  Auto Monocyte % : 8.7 %  Auto Eosinophil % : 8.0 %  Auto Basophil % : 1.5 %      16    131<L>  |  100  |  16  ----------------------------<  400<H>  4.8   |  25  |  1.08    Ca    8.7      2020 09:15  Mg     2.1     -    TPro  7.5  /  Alb  3.4  /  TBili  0.5  /  DBili  x   /  AST  19  /  ALT  28  /  AlkPhos  74  02-16      LIVER FUNCTIONS - ( 2020 09:15 )  Alb: 3.4 g/dL / Pro: 7.5 gm/dL / ALK PHOS: 74 U/L / ALT: 28 U/L / AST: 19 U/L / GGT: x                 CARDIAC MARKERS ( 2020 11:44 )  <0.015 ng/mL / x     / x     / x     / x      CARDIAC MARKERS ( 2020 09:15 )  <0.015 ng/mL / x     / x     / x     / x            Urinalysis Basic - ( 2020 09:44 )    Color: Yellow / Appearance: Clear / S.010 / pH: x  Gluc: x / Ketone: Negative  / Bili: Negative / Urobili: Negative mg/dL   Blood: x / Protein: 100 mg/dL / Nitrite: Negative   Leuk Esterase: Negative / RBC: 6-10 /HPF / WBC 0-2   Sq Epi: x / Non Sq Epi: Occasional / Bacteria: Occasional            MEDICATIONS  (STANDING):  atorvastatin 40 milliGRAM(s) Oral at bedtime  gabapentin 100 milliGRAM(s) Oral three times a day  metoprolol succinate ER 25 milliGRAM(s) Oral two times a day  OLANZapine 10 milliGRAM(s) Oral at bedtime  venlafaxine  milliGRAM(s) Oral daily

## 2020-02-16 NOTE — ED BEHAVIORAL HEALTH ASSESSMENT NOTE - SUICIDE PROTECTIVE FACTORS
Has future plans/Identifies reasons for living/Supportive social network of family or friends/Responsibility to family and others

## 2020-02-16 NOTE — ED BEHAVIORAL HEALTH ASSESSMENT NOTE - PSYCHIATRIC ISSUES AND PLAN (INCLUDE STANDING AND PRN MEDICATION)
Schizoaffective disorder: Olanzapine 10mg HS, Effexor XR 150mg PO daily per outpatient psychiatric provider.

## 2020-02-16 NOTE — ED BEHAVIORAL HEALTH ASSESSMENT NOTE - SUMMARY
Patient is a 59-year-old  male, with psychiatric history of schizo affective disorder, with multiple prior psychiatric admissions, with prior self injurious behaviors in 1980, no press suicide attempt, living in a group home, with no substance abuse, no legal problems, presents after self referral for homicidal thoughts in the setting of interpersonal conflict with roommates.    Patient presents with adjustment reaction with disturbance in mood.  However, he reports current homicidal; ideations, noting he will hurt his peer if he is discharged back to his residence. Patient is preoccupied with killing his peer, and feels disenfranchised in that nothing has been done about him. He is not able to reliably contract for safety at this time. Patient will benefit from inpatient psychiatric admission for safety and stabilization. Patient is a 59-year-old  male, with psychiatric history of schizo affective disorder, with multiple prior psychiatric admissions, with prior self injurious behaviors in 1980, no press suicide attempt, living in a group home, with no substance abuse, no legal problems, presents after self referral for homicidal thoughts in the setting of interpersonal conflict with roommates.    Patient presents with adjustment reaction with disturbance in mood.  However, he reports current homicidal ideations, noting he will hurt his peer if he is discharged back to his residence. Patient is preoccupied with killing his peer, and feels disenfranchised in that nothing has been done about his peer. He reports current paranoid thoughts related to "all staff" at his residence turning against him and taking his peer's side. He is not able to reliably contract for safety at this time. Patient will benefit from inpatient psychiatric admission for safety and stabilization.  Case discussed with Dr. Reaves who agrees with the plan to admit patient to inpatient psychiatry.

## 2020-02-16 NOTE — ED ADULT NURSE REASSESSMENT NOTE - NS ED NURSE REASSESS COMMENT FT1
pt. given meal tray, safety maintained, constant observation intact, pt. to be admitted to , awaiting legals.

## 2020-02-16 NOTE — ED ADULT NURSE NOTE - OBJECTIVE STATEMENT
pt is a 58 y/o male w/ pmhx of schizo effective disorder, anxiety, HTN, DM 2, presenting to ED for eval of chest pain while having an argument with his roommate. pt states he wants to shank his roommate with a blade 2/2 to disagreement. pt denies Si, hallucinations, palpitations, dyspnea, weakness, fever, chills, n/v/d

## 2020-02-17 DIAGNOSIS — R45.851 SUICIDAL IDEATIONS: ICD-10-CM

## 2020-02-17 DIAGNOSIS — T88.9XXA COMPLICATION OF SURGICAL AND MEDICAL CARE, UNSPECIFIED, INITIAL ENCOUNTER: ICD-10-CM

## 2020-02-17 DIAGNOSIS — R45.850 HOMICIDAL IDEATIONS: ICD-10-CM

## 2020-02-17 LAB — HBA1C BLD-MCNC: 11 % — HIGH (ref 4–5.6)

## 2020-02-17 PROCEDURE — 99232 SBSQ HOSP IP/OBS MODERATE 35: CPT

## 2020-02-17 PROCEDURE — 99231 SBSQ HOSP IP/OBS SF/LOW 25: CPT

## 2020-02-17 RX ORDER — INSULIN LISPRO 100/ML
6 VIAL (ML) SUBCUTANEOUS
Refills: 0 | Status: DISCONTINUED | OUTPATIENT
Start: 2020-02-17 | End: 2020-02-18

## 2020-02-17 RX ORDER — OLANZAPINE 15 MG/1
15 TABLET, FILM COATED ORAL AT BEDTIME
Refills: 0 | Status: DISCONTINUED | OUTPATIENT
Start: 2020-02-17 | End: 2020-02-24

## 2020-02-17 RX ADMIN — Medication 25 MILLIGRAM(S): at 09:22

## 2020-02-17 RX ADMIN — OLANZAPINE 15 MILLIGRAM(S): 15 TABLET, FILM COATED ORAL at 20:57

## 2020-02-17 RX ADMIN — Medication 6 UNIT(S): at 17:12

## 2020-02-17 RX ADMIN — ATORVASTATIN CALCIUM 40 MILLIGRAM(S): 80 TABLET, FILM COATED ORAL at 20:57

## 2020-02-17 RX ADMIN — Medication 8: at 17:11

## 2020-02-17 RX ADMIN — Medication 150 MILLIGRAM(S): at 09:21

## 2020-02-17 RX ADMIN — GABAPENTIN 100 MILLIGRAM(S): 400 CAPSULE ORAL at 09:22

## 2020-02-17 RX ADMIN — GABAPENTIN 100 MILLIGRAM(S): 400 CAPSULE ORAL at 20:56

## 2020-02-17 RX ADMIN — Medication 6: at 12:02

## 2020-02-17 RX ADMIN — Medication 2: at 20:57

## 2020-02-17 RX ADMIN — Medication 25 MILLIGRAM(S): at 20:57

## 2020-02-17 RX ADMIN — GABAPENTIN 100 MILLIGRAM(S): 400 CAPSULE ORAL at 13:38

## 2020-02-17 RX ADMIN — Medication 10: at 07:48

## 2020-02-17 RX ADMIN — INSULIN GLARGINE 80 UNIT(S): 100 INJECTION, SOLUTION SUBCUTANEOUS at 20:57

## 2020-02-17 NOTE — PROGRESS NOTE BEHAVIORAL HEALTH - NSBHADDHXSUBSTFT_PSY_A_CORE
last beer is 1 & 1/2 week ago, .drinks 1-2 every  month or less .  denies recereational drug use last beer is 1 & 1/2 week ago, .drinks 1-2 every  month or less .  denies recreational drug use

## 2020-02-17 NOTE — PROGRESS NOTE ADULT - ASSESSMENT
59M hx htn hld dm cad schizoaffective disorder presents to the ED for chest pressure on and off for the past few days.troponin x2 negative in ED, patient was medically cleared By ED for psych admission    #Schizoaffective disorder /suical ideation  management per psych     #CADs/patypical  CP likely anxiety related /unlikley cardiac etiology  EKG NSR RBBB unchanged since feb 5th 2020, troponinx2 negative, no cardiac symptoms at this time   continue asa, (crestor not on formulary), start atorvastatin instead  continue metoprolol    # hx HTN controlled  # hx HLD  continue home meds as above    #hx DM uncontrolled FSBG 400  Not in DKA   addedhumalog 6 TID premeal  resume lantus 80units home dose  insulin sliding scale     # DVT prophylaxis  ambulation    thank you for the courtesy of this consult   f/u BMP in amd and added humalog TID today

## 2020-02-17 NOTE — PROGRESS NOTE BEHAVIORAL HEALTH - RISK ASSESSMENT
High risk:  Acute factors: Paranoid persecutory and bizarre delusions, feeling betrayed by housing staff, feeling depressed , claims suicidal ideation , has  plan and financial means  to purchase gun  Mitigating factor: willing to take medication ,  Protective: help seeking behavior  Chronic: prior psych hospitalization , hx of self injury,   Protective factors: stable place to live  Chronic : Mod risk:  Acute factors: Paranoid persecutory and bizarre delusions, feeling betrayed by housing staff, feeling depressed , claims suicidal ideation but denies any intent or real plan  , claims   plan and financial means  to purchase gun but no permit  Mitigating factor: willing to take medication ,has no permit to use a gun and no knowledge about guns use  Protective: help seeking behavior  Chronic: prior psych hospitalization , hx of self injury,   Protective factors: stable place to live  Chronic : prior psych hospitalization , prior hx of self injury

## 2020-02-17 NOTE — PROGRESS NOTE BEHAVIORAL HEALTH - NSBHFUPIPCHARTREVFT_PSY_A_CORE
59y Single, ,, disabled Male with schizoaffective disorder , living at  [place] [facility].  Pt initially at ED cc of chest pain but then claims he is having SI and states that "if his roommate says one more bad thing to him he is going to kill him and shank him with a blade." Claims he threatened to report the roommate to his  as he believes the person is smoking weed and selling prescription drugs in the community. Pt claimed he was afraid as the roommate retaliated by calling him names and threatened to kill him if he told the .   Pt continues with plan to purchase a gun and states he receives $1004 in SSD benefits monthly, which he can use to purchase a gun. He is preoccupied with wanting to hurt his peer and feels frustrated that noting is being done about the person.   Pt claimed he warned staff that if the roommate didn't stop harassing him that he wiould kill the roommate. 59y Single, ,, disabled Male with schizoaffective disorder , living at  [place] [facility]..  Pt initially at ED cc of chest pain but then claims he is having SI and states that "if his roommate says one more bad thing to him he is going to kill him and shank him with a blade." Claims he threatened to report the roommate to his  as he believes the person is smoking weed and selling prescription drugs in the community. Pt claimed he was afraid as the roommate retaliated by calling him names and threatened to kill him if he told the .   Pt continues with plan to purchase a gun and states he receives $1004 in SSD benefits monthly, which he can use to purchase a gun. He is preoccupied with wanting to hurt his peer and feels frustrated that noting is being done about the person.   Pt claimed he warned staff that if the roommate didn't stop harassing him that he wiould kill the roommate. 59y Single, ,, disabled Male with schizoaffective disorder , living at  [place] [facility] [employer] [facility]..  Pt initially at ED cc of chest pain but then claims he is having SI and states that "if his roommate says one more bad thing to him he is going to kill him and shank him with a blade." Claims he threatened to report the roommate to his  as he believes the person is smoking weed and selling prescription drugs in the community. Pt claimed he was afraid as the roommate retaliated by calling him names and threatened to kill him if he told the .   Pt continues with plan to purchase a gun and states he receives $1004 in SSD benefits monthly, which he can use to purchase a gun. He is preoccupied with wanting to hurt his peer and feels frustrated that noting is being done about the person. However is illogical as the pt also indicated he doesn't know how to use a gun and has no permit.    Pt claimed he warned staff that if the roommate didn't stop harassing him that he wiould kill the roommate.

## 2020-02-17 NOTE — PROGRESS NOTE BEHAVIORAL HEALTH - NSBHFUPSUICINTERVALFT_PSY_A_CORE
pt denies any suicidal intent or plan . Suicidal ideation in the context of needing a new place to live

## 2020-02-17 NOTE — PROGRESS NOTE BEHAVIORAL HEALTH - NSBHADDHXPSYCHFT_PSY_A_CORE
self injurious behaviors in 1980,  Claims in the past of having history of auditory hallucinations telling him to kill others and himself, however he is denying any current auditory hallucinations  Last saw his psychiatrist 2 months ago after he retired. States he has a new intake appointment on 03/18/20 [facility]

## 2020-02-17 NOTE — PROGRESS NOTE ADULT - SUBJECTIVE AND OBJECTIVE BOX
59M hx htn hld dm cad schizoaffective disorder presents to the ED for chest pressure on and off for the past few days. substernal comes and goes non-radiating. no f/c/sob/abd pain/n/v/diarrhea. non-exertional. no sweating.  . Pt also  states that his roommate has been bothering him lately - this has been making him agitated and over the past few days started having chest pressure. pt also stated he has been having suicidal ideations. Patient was medically cleared By ED for psych admission  patient admitted to psych for schizoaffective disorder and for concern of  harm to self or other     FSBG uncontrolled 300's patient at this time denies any CP or SOB or abd pain or nausea or vomiting, no fever or cough or diarrhea     Constitutional: NAD AAOx3  	Eyes: PERRLA EOMI  	Head: Normocephalic atraumatic  	Mouth: MMM  	Cardiac: regular rate   	Resp: Lungs CTAB  	GI: Abd s/nt/nd  	Neuro: CN2-12 intact  	Skin: No rashes       PHYSICAL EXAM:    Daily     Daily     ICU Vital Signs Last 24 Hrs  T(C): 36.4 (2020 07:53), Max: 36.4 (2020 07:53)  T(F): 97.6 (2020 07:53), Max: 97.6 (2020 07:53)  HR: --  BP: --  BP(mean): --  ABP: --  ABP(mean): --  RR: 16 (2020 07:53) (16 - 16)  SpO2: 100% (2020 07:53) (100% - 100%)      Constitutional: Well appearing  HEENT: Atraumatic, JC, Normal, No congestion  Respiratory: Breath Sounds normal, no rhonchi/wheeze  Cardiovascular: N S1S2; ANTONINA present  Gastrointestinal: Abdomen soft, non tender, Bowel Ssounds present  Extremities: No edema, peripheral pulses present  Neurological: AAO x 3, no gross focal motor deficits  Skin: Non cellulitic, no rash, ulcers  Lymph Nodes: No lymphadenopathy noted  Back: No CVA tenderness   Musculoskeletal: non tender  Breasts: Deferred  Genitourinary: deferred  Rectal: Deferred                          13.7   7.48  )-----------( 241      ( 2020 09:15 )             40.3       CBC Full  -  ( 2020 09:15 )  WBC Count : 7.48 K/uL  RBC Count : 4.75 M/uL  Hemoglobin : 13.7 g/dL  Hematocrit : 40.3 %  Platelet Count - Automated : 241 K/uL  Mean Cell Volume : 84.8 fl  Mean Cell Hemoglobin : 28.8 pg  Mean Cell Hemoglobin Concentration : 34.0 gm/dL  Auto Neutrophil # : 5.12 K/uL  Auto Lymphocyte # : 0.94 K/uL  Auto Monocyte # : 0.65 K/uL  Auto Eosinophil # : 0.60 K/uL  Auto Basophil # : 0.11 K/uL  Auto Neutrophil % : 68.4 %  Auto Lymphocyte % : 12.6 %  Auto Monocyte % : 8.7 %  Auto Eosinophil % : 8.0 %  Auto Basophil % : 1.5 %      16    131<L>  |  100  |  16  ----------------------------<  400<H>  4.8   |  25  |  1.08    Ca    8.7      2020 09:15  Mg     2.1     -16    TPro  7.5  /  Alb  3.4  /  TBili  0.5  /  DBili  x   /  AST  19  /  ALT  28  /  AlkPhos  74  02-16      LIVER FUNCTIONS - ( 2020 09:15 )  Alb: 3.4 g/dL / Pro: 7.5 gm/dL / ALK PHOS: 74 U/L / ALT: 28 U/L / AST: 19 U/L / GGT: x                 CARDIAC MARKERS ( 2020 11:44 )  <0.015 ng/mL / x     / x     / x     / x      CARDIAC MARKERS ( 2020 09:15 )  <0.015 ng/mL / x     / x     / x     / x            Urinalysis Basic - ( 2020 09:44 )    Color: Yellow / Appearance: Clear / S.010 / pH: x  Gluc: x / Ketone: Negative  / Bili: Negative / Urobili: Negative mg/dL   Blood: x / Protein: 100 mg/dL / Nitrite: Negative   Leuk Esterase: Negative / RBC: 6-10 /HPF / WBC 0-2   Sq Epi: x / Non Sq Epi: Occasional / Bacteria: Occasional            MEDICATIONS  (STANDING):  atorvastatin 40 milliGRAM(s) Oral at bedtime  dextrose 5%. 1000 milliLiter(s) (50 mL/Hr) IV Continuous <Continuous>  dextrose 50% Injectable 12.5 Gram(s) IV Push once  dextrose 50% Injectable 25 Gram(s) IV Push once  dextrose 50% Injectable 25 Gram(s) IV Push once  gabapentin 100 milliGRAM(s) Oral three times a day  insulin glargine Injectable (LANTUS) 80 Unit(s) SubCutaneous at bedtime  insulin lispro (HumaLOG) corrective regimen sliding scale   SubCutaneous three times a day before meals  insulin lispro (HumaLOG) corrective regimen sliding scale   SubCutaneous at bedtime  insulin lispro Injectable (HumaLOG) 6 Unit(s) SubCutaneous three times a day before meals  metoprolol succinate ER 25 milliGRAM(s) Oral two times a day  OLANZapine 10 milliGRAM(s) Oral at bedtime  venlafaxine  milliGRAM(s) Oral daily

## 2020-02-17 NOTE — PROGRESS NOTE BEHAVIORAL HEALTH - PROBLEM SELECTOR PLAN 4
1.  CAD- asa, atorvastatin,       continue metoprolol  2.  DM uncontrolledlantus 80units home dose  insulin sliding scale 1.  CAD- asa, atorvastatin,       continue metoprolol  2.  DM uncontrolled lantus 80units home dose  insulin sliding scale

## 2020-02-18 PROCEDURE — 99231 SBSQ HOSP IP/OBS SF/LOW 25: CPT

## 2020-02-18 PROCEDURE — 99232 SBSQ HOSP IP/OBS MODERATE 35: CPT

## 2020-02-18 RX ORDER — INSULIN GLARGINE 100 [IU]/ML
84 INJECTION, SOLUTION SUBCUTANEOUS AT BEDTIME
Refills: 0 | Status: DISCONTINUED | OUTPATIENT
Start: 2020-02-18 | End: 2020-02-19

## 2020-02-18 RX ORDER — INSULIN LISPRO 100/ML
8 VIAL (ML) SUBCUTANEOUS
Refills: 0 | Status: DISCONTINUED | OUTPATIENT
Start: 2020-02-18 | End: 2020-02-20

## 2020-02-18 RX ORDER — LISINOPRIL 2.5 MG/1
5 TABLET ORAL DAILY
Refills: 0 | Status: DISCONTINUED | OUTPATIENT
Start: 2020-02-18 | End: 2020-02-21

## 2020-02-18 RX ADMIN — Medication 6 UNIT(S): at 08:16

## 2020-02-18 RX ADMIN — Medication 25 MILLIGRAM(S): at 22:15

## 2020-02-18 RX ADMIN — GABAPENTIN 100 MILLIGRAM(S): 400 CAPSULE ORAL at 09:34

## 2020-02-18 RX ADMIN — Medication 6 UNIT(S): at 12:02

## 2020-02-18 RX ADMIN — Medication 150 MILLIGRAM(S): at 09:34

## 2020-02-18 RX ADMIN — Medication 3: at 22:19

## 2020-02-18 RX ADMIN — GABAPENTIN 100 MILLIGRAM(S): 400 CAPSULE ORAL at 17:29

## 2020-02-18 RX ADMIN — OLANZAPINE 15 MILLIGRAM(S): 15 TABLET, FILM COATED ORAL at 22:15

## 2020-02-18 RX ADMIN — Medication 4: at 12:01

## 2020-02-18 RX ADMIN — Medication 8: at 17:27

## 2020-02-18 RX ADMIN — Medication 25 MILLIGRAM(S): at 09:34

## 2020-02-18 RX ADMIN — Medication 6: at 08:15

## 2020-02-18 RX ADMIN — ATORVASTATIN CALCIUM 40 MILLIGRAM(S): 80 TABLET, FILM COATED ORAL at 22:15

## 2020-02-18 RX ADMIN — Medication 6 UNIT(S): at 17:28

## 2020-02-18 RX ADMIN — INSULIN GLARGINE 84 UNIT(S): 100 INJECTION, SOLUTION SUBCUTANEOUS at 22:18

## 2020-02-18 RX ADMIN — GABAPENTIN 100 MILLIGRAM(S): 400 CAPSULE ORAL at 22:16

## 2020-02-18 NOTE — PROGRESS NOTE BEHAVIORAL HEALTH - RISK ASSESSMENT
Moderate risk:  Acute factors: , feeling depressed , claims suicidal ideation but denies any intent or real plan  , claims   plan and financial means  to purchase gun but no permit  Mitigating factor: willing to take medication , WANTS TO BE ON INPATIENT AND WANTS TO BE REFERRED TO [facility]   has no permit to use a gun and no knowledge about guns use  Protective: help seeking behavior  Chronic: prior psych hospitalization , hx of self injury,   Protective factors: ACCES TO HEALTH CARE  mitigation: Inpatient [facility] OF CARE, COMPLIANT WITH MEDS, WILLING TO BE SENT TO [facility] when ready for d/c.

## 2020-02-18 NOTE — PROGRESS NOTE ADULT - ASSESSMENT
59M hx htn hld dm cad schizoaffective disorder presents to the ED for chest pressure on and off for the past few days.troponin x2 negative in ED, patient was medically cleared By ED for psych admission    #Schizoaffective disorder /suical ideation  management per psych     #CADs/patypical  CP likely anxiety related /unlikley cardiac etiology  EKG NSR RBBB unchanged since feb 5th 2020, troponinx2 negative, no cardiac symptoms at this time   continue asa, (crestor not on formulary), start atorvastatin instead  continue metoprolol    # hx HTN controlled  # hx HLD  continue home meds as above    #hx DM uncontrolled FSBG 400  Not in DKA  increase humalog 8 TID premeal  increase lantus 84units home dose  insulin sliding scale     # DVT prophylaxis  ambulation    thank you for the courtesy of this consult   f/u BMP in amd and increased  humalog TID and lantus today 59M hx htn hld dm cad schizoaffective disorder presents to the ED for chest pressure on and off for the past few days.troponin x2 negative in ED, patient was medically cleared By ED for psych admission    #Schizoaffective disorder /suical ideation  management per psych     #CADs/patypical  CP likely anxiety related /unlikley cardiac etiology  EKG NSR RBBB unchanged since feb 5th 2020, troponinx2 negative, no cardiac symptoms at this time   continue asa, (crestor not on formulary), start atorvastatin instead  continue metoprolol    # hx HTN/  # hx HLD  continue home meds as above    #hx DM uncontrolled FSBG 400  Not in DKA  increase humalog 8 TID premeal  increase lantus 84units home dose  insulin sliding scale     # DVT prophylaxis  ambulation    thank you for the courtesy of this consult   f/u BMP in amd and increased  humalog TID and lantus today 59M hx htn hld dm cad schizoaffective disorder presents to the ED for chest pressure on and off for the past few days.troponin x2 negative in ED, patient was medically cleared By ED for psych admission    #Schizoaffective disorder /suical ideation  management per psych     #CADs/patypical  CP likely anxiety related /unlikley cardiac etiology  EKG NSR RBBB unchanged since feb 5th 2020, troponinx2 negative, no cardiac symptoms at this time   continue asa, (crestor not on formulary), start atorvastatin instead  continue metoprolol    # hx HTN/  # hx HLD  continue home meds , BBl and add lisniopril    #hx DM uncontrolled FSBG 400  Not in DKA  increase humalog 8 TID premeal  increase lantus 84units home dose  insulin sliding scale     # DVT prophylaxis  ambulation    thank you for the courtesy of this consult   f/u BMP in amd and increased  humalog TID and lantus today

## 2020-02-18 NOTE — PROGRESS NOTE BEHAVIORAL HEALTH - PROBLEM SELECTOR PLAN 4
1.  CAD- asa, atorvastatin,       continue metoprolol  2.  DM uncontrolled lantus 80units home dose  insulin sliding scale

## 2020-02-18 NOTE — PROGRESS NOTE ADULT - SUBJECTIVE AND OBJECTIVE BOX
59M hx htn hld dm cad schizoaffective disorder presents to the ED for chest pressure on and off for the past few days. substernal comes and goes non-radiating. no f/c/sob/abd pain/n/v/diarrhea. non-exertional. no sweating.  . Pt also  states that his roommate has been bothering him lately - this has been making him agitated and over the past few days started having chest pressure. pt also stated he has been having suicidal ideations. Patient was medically cleared By ED for psych admission  patient admitted to psych for schizoaffective disorder and for concern of  harm to self or other     FSBG uncontrolled 300's patient at this time denies any CP or SOB or abd pain or nausea or vomiting, no fever or cough or diarrhea     Constitutional: NAD AAOx3  	Eyes: PERRLA EOMI  	Head: Normocephalic atraumatic  	Mouth: MMM  	Cardiac: regular rate   	Resp: Lungs CTAB  	GI: Abd s/nt/nd  	Neuro: CN2-12 intact  	Skin: No rashes       PHYSICAL EXAM:    Daily     Daily     ICU Vital Signs Last 24 Hrs  T(C): 36.3 (18 Feb 2020 07:49), Max: 36.3 (18 Feb 2020 07:49)  T(F): 97.4 (18 Feb 2020 07:49), Max: 97.4 (18 Feb 2020 07:49)  HR: --  BP: --  BP(mean): --  ABP: --  ABP(mean): --  RR: 16 (18 Feb 2020 07:49) (16 - 16)  SpO2: 100% (18 Feb 2020 07:49) (100% - 100%)                                              MEDICATIONS  (STANDING):  atorvastatin 40 milliGRAM(s) Oral at bedtime  dextrose 5%. 1000 milliLiter(s) (50 mL/Hr) IV Continuous <Continuous>  dextrose 50% Injectable 12.5 Gram(s) IV Push once  dextrose 50% Injectable 25 Gram(s) IV Push once  dextrose 50% Injectable 25 Gram(s) IV Push once  gabapentin 100 milliGRAM(s) Oral three times a day  insulin glargine Injectable (LANTUS) 80 Unit(s) SubCutaneous at bedtime  insulin lispro (HumaLOG) corrective regimen sliding scale   SubCutaneous three times a day before meals  insulin lispro (HumaLOG) corrective regimen sliding scale   SubCutaneous at bedtime  insulin lispro Injectable (HumaLOG) 6 Unit(s) SubCutaneous three times a day before meals  metoprolol succinate ER 25 milliGRAM(s) Oral two times a day  OLANZapine 15 milliGRAM(s) Oral at bedtime  venlafaxine  milliGRAM(s) Oral daily

## 2020-02-19 LAB
ANION GAP SERPL CALC-SCNC: 6 MMOL/L — SIGNIFICANT CHANGE UP (ref 5–17)
BUN SERPL-MCNC: 21 MG/DL — SIGNIFICANT CHANGE UP (ref 7–23)
CALCIUM SERPL-MCNC: 9.3 MG/DL — SIGNIFICANT CHANGE UP (ref 8.5–10.1)
CHLORIDE SERPL-SCNC: 100 MMOL/L — SIGNIFICANT CHANGE UP (ref 96–108)
CO2 SERPL-SCNC: 27 MMOL/L — SIGNIFICANT CHANGE UP (ref 22–31)
CREAT SERPL-MCNC: 1.13 MG/DL — SIGNIFICANT CHANGE UP (ref 0.5–1.3)
GLUCOSE SERPL-MCNC: 369 MG/DL — HIGH (ref 70–99)
POTASSIUM SERPL-MCNC: 4.6 MMOL/L — SIGNIFICANT CHANGE UP (ref 3.5–5.3)
POTASSIUM SERPL-SCNC: 4.6 MMOL/L — SIGNIFICANT CHANGE UP (ref 3.5–5.3)
SODIUM SERPL-SCNC: 133 MMOL/L — LOW (ref 135–145)

## 2020-02-19 PROCEDURE — 99232 SBSQ HOSP IP/OBS MODERATE 35: CPT

## 2020-02-19 RX ORDER — INSULIN GLARGINE 100 [IU]/ML
95 INJECTION, SOLUTION SUBCUTANEOUS AT BEDTIME
Refills: 0 | Status: DISCONTINUED | OUTPATIENT
Start: 2020-02-19 | End: 2020-02-21

## 2020-02-19 RX ADMIN — Medication 6: at 17:31

## 2020-02-19 RX ADMIN — Medication 25 MILLIGRAM(S): at 09:12

## 2020-02-19 RX ADMIN — Medication 8 UNIT(S): at 11:33

## 2020-02-19 RX ADMIN — GABAPENTIN 100 MILLIGRAM(S): 400 CAPSULE ORAL at 09:12

## 2020-02-19 RX ADMIN — GABAPENTIN 100 MILLIGRAM(S): 400 CAPSULE ORAL at 14:05

## 2020-02-19 RX ADMIN — Medication 6: at 11:33

## 2020-02-19 RX ADMIN — Medication 8: at 08:40

## 2020-02-19 RX ADMIN — Medication 25 MILLIGRAM(S): at 21:13

## 2020-02-19 RX ADMIN — GABAPENTIN 100 MILLIGRAM(S): 400 CAPSULE ORAL at 21:13

## 2020-02-19 RX ADMIN — ATORVASTATIN CALCIUM 40 MILLIGRAM(S): 80 TABLET, FILM COATED ORAL at 21:13

## 2020-02-19 RX ADMIN — INSULIN GLARGINE 95 UNIT(S): 100 INJECTION, SOLUTION SUBCUTANEOUS at 21:22

## 2020-02-19 RX ADMIN — Medication 150 MILLIGRAM(S): at 09:12

## 2020-02-19 RX ADMIN — Medication 8 UNIT(S): at 08:43

## 2020-02-19 RX ADMIN — Medication 8 UNIT(S): at 17:31

## 2020-02-19 RX ADMIN — Medication 3: at 21:23

## 2020-02-19 RX ADMIN — OLANZAPINE 15 MILLIGRAM(S): 15 TABLET, FILM COATED ORAL at 21:12

## 2020-02-19 RX ADMIN — LISINOPRIL 5 MILLIGRAM(S): 2.5 TABLET ORAL at 09:12

## 2020-02-19 NOTE — PROGRESS NOTE ADULT - ASSESSMENT
Assessment and Plan:    59M hx htn hld dm cad schizoaffective disorder presents to the ED for chest pressure on and off for the past few days.troponin x2 negative in ED, patient was medically cleared By ED for psych admission    #Schizoaffective disorder /suical ideation  management per psych     #CADs/patypical  CP likely anxiety related /unlikley cardiac etiology  EKG NSR RBBB unchanged since feb 5th 2020, troponinx2 negative, no cardiac symptoms at this time   continue asa, (crestor not on formulary), start atorvastatin instead  continue metoprolol    # HTN  # hx HLD  continue home meds , BBl and added lisniopril    #DM with hyperglycemia   Not in DKA  increased humalog 8 TID premeal  increase lantus to 95 units at bedtime  insulin sliding scale and accu checks    # DVT prophylaxis  ambulation    thank you for the courtesy of this consult   will f/u in AM    All questions/concerns were discussed with patient/family by bedside.    Case d/w *    Total time spent: *min. More than 50% of time was spent in counseling, discussion of medications, and coordination of care Assessment and Plan:    59M hx htn hld dm cad schizoaffective disorder presents to the ED for chest pressure on and off for the past few days.troponin x2 negative in ED, patient was medically cleared By ED for psych admission    #Schizoaffective disorder /suical ideation  management per psych     #CADs/patypical  CP likely anxiety related /unlikley cardiac etiology  EKG NSR RBBB unchanged since feb 5th 2020, troponinx2 negative, no cardiac symptoms at this time   continue asa, (crestor not on formulary), start atorvastatin instead  continue metoprolol    # HTN  # hx HLD  continue home meds , BBl and added lisniopril    #DM with hyperglycemia   Not in DKA  increased humalog 8 TID premeal  increase lantus to 95 units at bedtime  insulin sliding scale and accu checks    # DVT prophylaxis  ambulation    thank you for the courtesy of this consult   will f/u in AM    All questions/concerns were discussed with patient/family by bedside.    Case d/w RN    Total time spent: 30min. More than 50% of time was spent in counseling, discussion of medications, and coordination of care

## 2020-02-19 NOTE — PROGRESS NOTE BEHAVIORAL HEALTH - RISK ASSESSMENT
NO changes in risks: Moderate risk:  Acute factors: , feeling depressed , claims suicidal ideation but denies any intent or real plan  , claims   plan and financial means  to purchase gun but no permit  Mitigating factor: willing to take medication , WANTS TO BE ON INPATIENT AND WANTS TO BE REFERRED TO [facility]   has no permit to use a gun and no knowledge about guns use  Protective: help seeking behavior  Chronic: prior psych hospitalization , hx of self injury,   Protective factors: ACCES TO HEALTH CARE  mitigation: Inpatient [facility] CARE, COMPLIANT WITH MEDS, WILLING TO BE SENT TO [facility] when ready for d/c.

## 2020-02-19 NOTE — PROGRESS NOTE ADULT - SUBJECTIVE AND OBJECTIVE BOX
CC/reason for follow up: *    S: *    ROS: no chest pain, no dyspnea    T(C): 36.3 (02-19-20 @ 08:19), Max: 36.3 (02-19-20 @ 08:19)  HR: 92 (02-18-20 @ 23:50) (92 - 92)  BP: 155/84 (02-18-20 @ 23:50) (155/84 - 155/84)  RR: 14 (02-19-20 @ 08:19) (14 - 14)  SpO2: 97% (02-19-20 @ 08:19) (97% - 97%)    Gen - Pleasant, cooperative, in no acute distress  HEENT- PERRL, moist mucus membranes, OP clear  CV - RRR, No m/r/g, +pulses, no edema  Lungs - Good effort, Clear to auscultation bilaterally  Abdomen - Soft, nontender, nondistended, +BS, No rebound/rigidity/guarding  Ext - No cyanosis/clubbing. 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 moves all ext    MEDICATIONS  (STANDING):  atorvastatin 40 milliGRAM(s) Oral at bedtime  dextrose 5%. 1000 milliLiter(s) (50 mL/Hr) IV Continuous <Continuous>  dextrose 50% Injectable 12.5 Gram(s) IV Push once  dextrose 50% Injectable 25 Gram(s) IV Push once  dextrose 50% Injectable 25 Gram(s) IV Push once  gabapentin 100 milliGRAM(s) Oral three times a day  insulin glargine Injectable (LANTUS) 95 Unit(s) SubCutaneous at bedtime  insulin lispro (HumaLOG) corrective regimen sliding scale   SubCutaneous three times a day before meals  insulin lispro (HumaLOG) corrective regimen sliding scale   SubCutaneous at bedtime  insulin lispro Injectable (HumaLOG) 8 Unit(s) SubCutaneous three times a day before meals  lisinopril 5 milliGRAM(s) Oral daily  metoprolol succinate ER 25 milliGRAM(s) Oral two times a day  OLANZapine 15 milliGRAM(s) Oral at bedtime  venlafaxine  milliGRAM(s) Oral daily    MEDICATIONS  (PRN):  dextrose 40% Gel 15 Gram(s) Oral once PRN Blood Glucose LESS THAN 70 milliGRAM(s)/deciliter  diphenhydrAMINE 25 milliGRAM(s) Oral every 6 hours PRN EPS  glucagon  Injectable 1 milliGRAM(s) IntraMuscular once PRN Glucose LESS THAN 70 milligrams/deciliter  haloperidol     Tablet 5 milliGRAM(s) Oral every 6 hours PRN psychosis  LORazepam     Tablet 2 milliGRAM(s) Oral every 6 hours PRN Anxiety      Diagnostic studies: personally reviewed  LABS: All Labs Reviewed:    02-19    133<L>  |  100  |  21  ----------------------------<  369<H>  4.6   |  27  |  1.13    Ca    9.3      19 Feb 2020 09:10              Blood Culture:   RADIOLOGY/EKG:    < from: Xray Chest 1 View-PORTABLE IMMEDIATE (02.16.20 @ 08:59) >  Findings:    The heart is normal in size.  The lungs are grossly clear. Elevation of the right hemidiaphragm. The apices are unremarkable. Degenerative changes of the visualized osseous structures.        Impression:    No acute disease    < end of copied text > CC/reason for follow up: hyperglycemia    S: says he feels better now that SW is helping him get placed at respite care after discharge. denies any new complaints    ROS: no chest pain, no dyspnea    T(C): 36.3 (02-19-20 @ 08:19), Max: 36.3 (02-19-20 @ 08:19)  HR: 92 (02-18-20 @ 23:50) (92 - 92)  BP: 155/84 (02-18-20 @ 23:50) (155/84 - 155/84)  RR: 14 (02-19-20 @ 08:19) (14 - 14)  SpO2: 97% (02-19-20 @ 08:19) (97% - 97%)    Gen - Pleasant, cooperative, in no acute distress  HEENT- PERRL, moist mucus membranes, OP clear  CV - RRR, No m/r/g, +pulses, no edema  Lungs - Good effort, Clear to auscultation bilaterally  Abdomen - Soft, nontender, nondistended, +BS, No rebound/rigidity/guarding  Ext - No cyanosis/clubbing. 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 moves all ext    MEDICATIONS  (STANDING):  atorvastatin 40 milliGRAM(s) Oral at bedtime  dextrose 5%. 1000 milliLiter(s) (50 mL/Hr) IV Continuous <Continuous>  dextrose 50% Injectable 12.5 Gram(s) IV Push once  dextrose 50% Injectable 25 Gram(s) IV Push once  dextrose 50% Injectable 25 Gram(s) IV Push once  gabapentin 100 milliGRAM(s) Oral three times a day  insulin glargine Injectable (LANTUS) 95 Unit(s) SubCutaneous at bedtime  insulin lispro (HumaLOG) corrective regimen sliding scale   SubCutaneous three times a day before meals  insulin lispro (HumaLOG) corrective regimen sliding scale   SubCutaneous at bedtime  insulin lispro Injectable (HumaLOG) 8 Unit(s) SubCutaneous three times a day before meals  lisinopril 5 milliGRAM(s) Oral daily  metoprolol succinate ER 25 milliGRAM(s) Oral two times a day  OLANZapine 15 milliGRAM(s) Oral at bedtime  venlafaxine  milliGRAM(s) Oral daily    MEDICATIONS  (PRN):  dextrose 40% Gel 15 Gram(s) Oral once PRN Blood Glucose LESS THAN 70 milliGRAM(s)/deciliter  diphenhydrAMINE 25 milliGRAM(s) Oral every 6 hours PRN EPS  glucagon  Injectable 1 milliGRAM(s) IntraMuscular once PRN Glucose LESS THAN 70 milligrams/deciliter  haloperidol     Tablet 5 milliGRAM(s) Oral every 6 hours PRN psychosis  LORazepam     Tablet 2 milliGRAM(s) Oral every 6 hours PRN Anxiety      Diagnostic studies: personally reviewed  LABS: All Labs Reviewed:    02-19    133<L>  |  100  |  21  ----------------------------<  369<H>  4.6   |  27  |  1.13    Ca    9.3      19 Feb 2020 09:10              Blood Culture:   RADIOLOGY/EKG:    < from: Xray Chest 1 View-PORTABLE IMMEDIATE (02.16.20 @ 08:59) >  Findings:    The heart is normal in size.  The lungs are grossly clear. Elevation of the right hemidiaphragm. The apices are unremarkable. Degenerative changes of the visualized osseous structures.        Impression:    No acute disease    < end of copied text >

## 2020-02-20 DIAGNOSIS — Z71.89 OTHER SPECIFIED COUNSELING: ICD-10-CM

## 2020-02-20 PROCEDURE — 99232 SBSQ HOSP IP/OBS MODERATE 35: CPT

## 2020-02-20 RX ORDER — TUBERCULIN PURIFIED PROTEIN DERIVATIVE 5 [IU]/.1ML
5 INJECTION, SOLUTION INTRADERMAL ONCE
Refills: 0 | Status: COMPLETED | OUTPATIENT
Start: 2020-02-20 | End: 2020-02-20

## 2020-02-20 RX ORDER — INSULIN LISPRO 100/ML
10 VIAL (ML) SUBCUTANEOUS
Refills: 0 | Status: DISCONTINUED | OUTPATIENT
Start: 2020-02-20 | End: 2020-02-21

## 2020-02-20 RX ADMIN — GABAPENTIN 100 MILLIGRAM(S): 400 CAPSULE ORAL at 21:08

## 2020-02-20 RX ADMIN — GABAPENTIN 100 MILLIGRAM(S): 400 CAPSULE ORAL at 09:33

## 2020-02-20 RX ADMIN — Medication 6: at 08:38

## 2020-02-20 RX ADMIN — Medication 25 MILLIGRAM(S): at 21:09

## 2020-02-20 RX ADMIN — Medication 150 MILLIGRAM(S): at 09:33

## 2020-02-20 RX ADMIN — GABAPENTIN 100 MILLIGRAM(S): 400 CAPSULE ORAL at 13:51

## 2020-02-20 RX ADMIN — ATORVASTATIN CALCIUM 40 MILLIGRAM(S): 80 TABLET, FILM COATED ORAL at 21:09

## 2020-02-20 RX ADMIN — Medication 10 UNIT(S): at 17:30

## 2020-02-20 RX ADMIN — TUBERCULIN PURIFIED PROTEIN DERIVATIVE 5 UNIT(S): 5 INJECTION, SOLUTION INTRADERMAL at 21:24

## 2020-02-20 RX ADMIN — OLANZAPINE 15 MILLIGRAM(S): 15 TABLET, FILM COATED ORAL at 21:09

## 2020-02-20 RX ADMIN — Medication 2: at 17:29

## 2020-02-20 RX ADMIN — Medication 25 MILLIGRAM(S): at 09:33

## 2020-02-20 RX ADMIN — Medication 8 UNIT(S): at 12:43

## 2020-02-20 RX ADMIN — Medication 8 UNIT(S): at 08:38

## 2020-02-20 RX ADMIN — LISINOPRIL 5 MILLIGRAM(S): 2.5 TABLET ORAL at 09:33

## 2020-02-20 RX ADMIN — Medication 6: at 12:43

## 2020-02-20 RX ADMIN — INSULIN GLARGINE 95 UNIT(S): 100 INJECTION, SOLUTION SUBCUTANEOUS at 21:07

## 2020-02-20 NOTE — PHYSICAL THERAPY INITIAL EVALUATION ADULT - PERTINENT HX OF CURRENT PROBLEM, REHAB EVAL
58 yo M admitted with psychiatric history of schizo affective disorder, with multiple prior psychiatric admissions, with prior self injurious behaviors in 1980, no press suicide attempt, living in a group home, with no substance abuse, no legal problems, presents after self referral for thoughts of self harm in the setting of interpersonal conflict with roommates.

## 2020-02-20 NOTE — PROGRESS NOTE BEHAVIORAL HEALTH - PROBLEM SELECTOR PLAN 3
pt denies any intent or plan  claims suicidal ideation in context of wanting a new place to live

## 2020-02-20 NOTE — PHYSICAL THERAPY INITIAL EVALUATION ADULT - AMBULATION SKILLS, REHAB EVAL
ORTHOPEDIC PATIENT EVALUATION    HPI / Chief Complaint  Zina Novak is a 51 y.o. female who presents for evaluation of her right knee. Several days ago she indicates that she bumped this knee and she is been having pain. She has a difficult time bending her knee and her pain is primarily localized to the posterior knee and posterior thigh region. Its constant. She does report having some swelling but denies having any mechanical symptoms. She also denies having any radicular symptoms or numbness/tingling. Of note she does have a history of chronic low back pain and disc problems. Past Medical History  Lenora  has a past medical history of Anomalous coronary artery origin, Anxiety, Asthma, Back pain, Diabetes mellitus ([address] Mitral prolapse, and Thoracic outlet syndrome. Past Surgical History  Lenora  has a past surgical history that includes back surgery; hernia repair; Abdomen surgery; Tubal ligation; and  section.     Current Medications  Current Outpatient Medications   Medication Sig Dispense Refill    diclofenac sodium (VOLTAREN) 1 % GEL Apply 2 g topically 4 times daily 1 Tube 2    diclofenac (VOLTAREN) 75 MG EC tablet Take 1 tablet by mouth 2 times daily (with meals) for 14 days 28 tablet 0    cyclobenzaprine (FLEXERIL) 5 MG tablet Take 1 tablet by mouth 2 times daily as needed for Muscle spasms 30 tablet 0    senna-docusate (PERICOLACE) 8.6-50 MG per tablet TAKE TWO TABLETS BY MOUTH DAILY AS NEEDED FOR CONSTIPATION 30 tablet 0    hydrocortisone 2.5 % cream Apply once daily to corners of mouth 60 g 5    ketoconazole (NIZORAL) 2 % cream Apply once daily to corners of mouth 30 g 2    albuterol sulfate HFA (VENTOLIN HFA) 108 (90 Base) MCG/ACT inhaler Inhale 2 puffs into the lungs every 6 hours as needed for Wheezing 1 Inhaler 3    nystatin (MYCOSTATIN) 684504 UNIT/ML suspension Take 5 mLs by mouth 4 times daily Swish and spit 120 mL 1    ibuprofen (ADVIL;MOTRIN) 800 MG tablet Take 1
needs device/cane/independent

## 2020-02-20 NOTE — PROGRESS NOTE ADULT - SUBJECTIVE AND OBJECTIVE BOX
CC/reason for follow up: hyperglycemia    S: *    ROS: no chest pain, no dyspnea    T(C): 36.1 (02-20-20 @ 08:09), Max: 36.1 (02-20-20 @ 08:09)  HR: --  BP: --  RR: 16 (02-20-20 @ 08:09) (16 - 16)  SpO2: 98% (02-20-20 @ 08:09) (98% - 98%)    Gen - Pleasant, cooperative, in no acute distress  HEENT- PERRL, moist mucus membranes, OP clear  CV - RRR, No m/r/g, +pulses, no edema  Lungs - Good effort, Clear to auscultation bilaterally  Abdomen - Soft, nontender, nondistended, +BS, No rebound/rigidity/guarding  Ext - No cyanosis/clubbing. 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 moves all ext    MEDICATIONS  (STANDING):  atorvastatin 40 milliGRAM(s) Oral at bedtime  dextrose 5%. 1000 milliLiter(s) (50 mL/Hr) IV Continuous <Continuous>  dextrose 50% Injectable 12.5 Gram(s) IV Push once  dextrose 50% Injectable 25 Gram(s) IV Push once  dextrose 50% Injectable 25 Gram(s) IV Push once  gabapentin 100 milliGRAM(s) Oral three times a day  insulin glargine Injectable (LANTUS) 95 Unit(s) SubCutaneous at bedtime  insulin lispro (HumaLOG) corrective regimen sliding scale   SubCutaneous three times a day before meals  insulin lispro (HumaLOG) corrective regimen sliding scale   SubCutaneous at bedtime  insulin lispro Injectable (HumaLOG) 8 Unit(s) SubCutaneous three times a day before meals  lisinopril 5 milliGRAM(s) Oral daily  metoprolol succinate ER 25 milliGRAM(s) Oral two times a day  OLANZapine 15 milliGRAM(s) Oral at bedtime  venlafaxine  milliGRAM(s) Oral daily    MEDICATIONS  (PRN):  dextrose 40% Gel 15 Gram(s) Oral once PRN Blood Glucose LESS THAN 70 milliGRAM(s)/deciliter  diphenhydrAMINE 25 milliGRAM(s) Oral every 6 hours PRN EPS  glucagon  Injectable 1 milliGRAM(s) IntraMuscular once PRN Glucose LESS THAN 70 milligrams/deciliter  haloperidol     Tablet 5 milliGRAM(s) Oral every 6 hours PRN psychosis  LORazepam     Tablet 2 milliGRAM(s) Oral every 6 hours PRN Anxiety      Diagnostic studies: personally reviewed  LABS: All Labs Reviewed:    02-19    133<L>  |  100  |  21  ----------------------------<  369<H>  4.6   |  27  |  1.13    Ca    9.3      19 Feb 2020 09:10              Blood Culture:   RADIOLOGY/EKG:    < from: Xray Chest 1 View-PORTABLE IMMEDIATE (02.16.20 @ 08:59) >  Findings:    The heart is normal in size.  The lungs are grossly clear. Elevation of the right hemidiaphragm. The apices are unremarkable. Degenerative changes of the visualized osseous structures.        Impression:    No acute disease    < end of copied text > CC/reason for follow up: hyperglycemia    S: no complaints    ROS: no chest pain, no dyspnea    T(C): 36.1 (02-20-20 @ 08:09), Max: 36.1 (02-20-20 @ 08:09)  HR: 83  BP: 124/83  RR: 16 (02-20-20 @ 08:09) (16 - 16)  SpO2: 98% (02-20-20 @ 08:09) (98% - 98%)    Gen - Pleasant, cooperative, in no acute distress  HEENT- PERRL, moist mucus membranes, OP clear  CV - RRR, No m/r/g, +pulses, no edema  Lungs - Good effort, Clear to auscultation bilaterally  Abdomen - Soft, nontender, nondistended, +BS, No rebound/rigidity/guarding  Ext - No cyanosis/clubbing. 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 moves all ext    MEDICATIONS  (STANDING):  atorvastatin 40 milliGRAM(s) Oral at bedtime  dextrose 5%. 1000 milliLiter(s) (50 mL/Hr) IV Continuous <Continuous>  dextrose 50% Injectable 12.5 Gram(s) IV Push once  dextrose 50% Injectable 25 Gram(s) IV Push once  dextrose 50% Injectable 25 Gram(s) IV Push once  gabapentin 100 milliGRAM(s) Oral three times a day  insulin glargine Injectable (LANTUS) 95 Unit(s) SubCutaneous at bedtime  insulin lispro (HumaLOG) corrective regimen sliding scale   SubCutaneous three times a day before meals  insulin lispro (HumaLOG) corrective regimen sliding scale   SubCutaneous at bedtime  insulin lispro Injectable (HumaLOG) 8 Unit(s) SubCutaneous three times a day before meals  lisinopril 5 milliGRAM(s) Oral daily  metoprolol succinate ER 25 milliGRAM(s) Oral two times a day  OLANZapine 15 milliGRAM(s) Oral at bedtime  venlafaxine  milliGRAM(s) Oral daily    MEDICATIONS  (PRN):  dextrose 40% Gel 15 Gram(s) Oral once PRN Blood Glucose LESS THAN 70 milliGRAM(s)/deciliter  diphenhydrAMINE 25 milliGRAM(s) Oral every 6 hours PRN EPS  glucagon  Injectable 1 milliGRAM(s) IntraMuscular once PRN Glucose LESS THAN 70 milligrams/deciliter  haloperidol     Tablet 5 milliGRAM(s) Oral every 6 hours PRN psychosis  LORazepam     Tablet 2 milliGRAM(s) Oral every 6 hours PRN Anxiety      Diagnostic studies: personally reviewed  LABS: All Labs Reviewed:    02-19    133<L>  |  100  |  21  ----------------------------<  369<H>  4.6   |  27  |  1.13    Ca    9.3      19 Feb 2020 09:10              Blood Culture:   RADIOLOGY/EKG:    < from: Xray Chest 1 View-PORTABLE IMMEDIATE (02.16.20 @ 08:59) >  Findings:    The heart is normal in size.  The lungs are grossly clear. Elevation of the right hemidiaphragm. The apices are unremarkable. Degenerative changes of the visualized osseous structures.        Impression:    No acute disease    < end of copied text >

## 2020-02-20 NOTE — PROGRESS NOTE BEHAVIORAL HEALTH - PROBLEM SELECTOR PLAN 2
pt with unrealistic expectation that he gen get another apartment from inpatient psychiatry and in that sense expressing HI towards to peers in group home.
pt with unrealistic plan to purchase and use a gun - no permit or training on use of a gun

## 2020-02-20 NOTE — PROGRESS NOTE BEHAVIORAL HEALTH - PROBLEM SELECTOR PROBLEM 4
Medical care complication

## 2020-02-20 NOTE — PHYSICAL THERAPY INITIAL EVALUATION ADULT - GENERAL OBSERVATIONS, REHAB EVAL
Patient received in bed on 5N.  Pleasant and cooperative.  Patient denied pain throughout session. + R foot 5 toe amputation.

## 2020-02-20 NOTE — PHYSICAL THERAPY INITIAL EVALUATION ADULT - MODALITIES TREATMENT COMMENTS
Patient reported he feels off balance when ambulating without device.  Would recommend prosthetic toe box  shoe, to be ordered by a podiatrist. Patient independent in functional mobility, no skilled physical therapy need in this setting.  Recommend cane for ambulation device when d/c'd  from [facility]. May ambulate per nursing.  Will d/c from PT.  CM informed.

## 2020-02-20 NOTE — PROGRESS NOTE BEHAVIORAL HEALTH - NSBHADMITMEDEDUDETAILS_A_CORE FT
Pt was educated about the use of medication and of the potential side effects, and of the need to inform his Provider of any presence of any side effects of the medications .

## 2020-02-20 NOTE — PROGRESS NOTE ADULT - ASSESSMENT
Assessment and Plan:    59M hx htn hld dm cad schizoaffective disorder presents to the ED for chest pressure on and off for the past few days.troponin x2 negative in ED, patient was medically cleared By ED for psych admission    #Schizoaffective disorder /suical ideation  management per psych     #CADs/patypical  CP likely anxiety related /unlikley cardiac etiology  EKG NSR RBBB unchanged since feb 5th 2020, troponinx2 negative, no cardiac symptoms at this time   continue asa, (crestor not on formulary), start atorvastatin instead  continue metoprolol    # HTN  # hx HLD  continue home meds , BBl and added lisniopril    #DM with hyperglycemia   Not in DKA  increase humalog to 10 units with meals  increased lantus to 95 units at bedtime  insulin sliding scale and accu checks    # DVT prophylaxis  ambulation    thank you for the courtesy of this consult     All questions/concerns were discussed with patient/family by bedside.    Case d/w RN    Total time spent: 30min. More than 50% of time was spent in counseling, discussion of medications, and coordination of care Assessment and Plan:    59M hx htn hld dm cad schizoaffective disorder presents to the ED for chest pressure on and off for the past few days.troponin x2 negative in ED, patient was medically cleared By ED for psych admission    #Schizoaffective disorder /suical ideation  management per psych     #CADs/patypical  CP likely anxiety related /unlikley cardiac etiology  EKG NSR RBBB unchanged since feb 5th 2020, troponinx2 negative, no cardiac symptoms at this time   continue asa, (crestor not on formulary), start atorvastatin instead  continue metoprolol    # HTN  # hx HLD  continue home meds , BBl and added lisniopril    #DM with hyperglycemia   Not in DKA  increase humalog to 10 units with meals - will need new prescription on discharge  increased lantus to 95 units at bedtime  insulin sliding scale and accu checks    # DVT prophylaxis  ambulation    thank you for the courtesy of this consult     All questions/concerns were discussed with patient/family by bedside.    Case d/w RN    Total time spent: 30min. More than 50% of time was spent in counseling, discussion of medications, and coordination of care

## 2020-02-21 PROCEDURE — 99232 SBSQ HOSP IP/OBS MODERATE 35: CPT

## 2020-02-21 RX ORDER — INSULIN GLARGINE 100 [IU]/ML
100 INJECTION, SOLUTION SUBCUTANEOUS AT BEDTIME
Refills: 0 | Status: DISCONTINUED | OUTPATIENT
Start: 2020-02-21 | End: 2020-02-24

## 2020-02-21 RX ORDER — INSULIN LISPRO 100/ML
15 VIAL (ML) SUBCUTANEOUS
Refills: 0 | Status: DISCONTINUED | OUTPATIENT
Start: 2020-02-22 | End: 2020-02-24

## 2020-02-21 RX ORDER — OLANZAPINE 15 MG/1
2.5 TABLET, FILM COATED ORAL DAILY
Refills: 0 | Status: DISCONTINUED | OUTPATIENT
Start: 2020-02-21 | End: 2020-02-24

## 2020-02-21 RX ADMIN — Medication 3: at 21:03

## 2020-02-21 RX ADMIN — Medication 10 UNIT(S): at 17:26

## 2020-02-21 RX ADMIN — Medication 25 MILLIGRAM(S): at 09:18

## 2020-02-21 RX ADMIN — INSULIN GLARGINE 100 UNIT(S): 100 INJECTION, SOLUTION SUBCUTANEOUS at 21:22

## 2020-02-21 RX ADMIN — GABAPENTIN 100 MILLIGRAM(S): 400 CAPSULE ORAL at 09:18

## 2020-02-21 RX ADMIN — Medication 150 MILLIGRAM(S): at 09:18

## 2020-02-21 RX ADMIN — Medication 10 UNIT(S): at 07:52

## 2020-02-21 RX ADMIN — OLANZAPINE 15 MILLIGRAM(S): 15 TABLET, FILM COATED ORAL at 21:03

## 2020-02-21 RX ADMIN — Medication 8: at 17:26

## 2020-02-21 RX ADMIN — Medication 6: at 07:49

## 2020-02-21 RX ADMIN — Medication 10 UNIT(S): at 12:12

## 2020-02-21 RX ADMIN — ATORVASTATIN CALCIUM 40 MILLIGRAM(S): 80 TABLET, FILM COATED ORAL at 21:03

## 2020-02-21 RX ADMIN — GABAPENTIN 100 MILLIGRAM(S): 400 CAPSULE ORAL at 21:03

## 2020-02-21 RX ADMIN — Medication 25 MILLIGRAM(S): at 21:03

## 2020-02-21 RX ADMIN — GABAPENTIN 100 MILLIGRAM(S): 400 CAPSULE ORAL at 12:41

## 2020-02-21 RX ADMIN — Medication 8: at 12:12

## 2020-02-21 NOTE — PROGRESS NOTE BEHAVIORAL HEALTH - RISK ASSESSMENT
Mitigation of Acute risk factors: No SI and HI, he threateners to kill people if he does not like some aspects of d/c planning, but no genuine SI and Hi. NO plan or intent to harm anyone.     Mitigating factor: willing to take medication , WANTS TO BE ON INPATIENT AND WANTS TO BE REFERRED TO respite    Protective: help seeking behavior, has no permit to use a gun and no knowledge about guns use, ACCES TO HEALTH CARE  Chronic: prior psych hospitalization , hx of self injury,

## 2020-02-21 NOTE — PROGRESS NOTE ADULT - SUBJECTIVE AND OBJECTIVE BOX
T(C): 35.6 (02-21-20 @ 07:57), Max: 35.6 (02-21-20 @ 07:57)  HR: 81  BP: 111/74  RR: 14 (02-21-20 @ 07:57) (14 - 14)  SpO2: 98% (02-21-20 @ 07:57) (98% - 98%)    Chart reviewed.   Pt had BP of 95/ 67 earlier today. Plan: STOPPED LISINOPRIL  Glucs remain elevated - Plan: INCREASE LANTUS  UNITS NIGHTLY. CHANGE HUMALOG TO 15 UNITS TID W/ MEALS STARTING TOMORROW. CONTINUE ACCU CHECKS AND INSULIN SLIDING SCALE.

## 2020-02-22 PROCEDURE — 99232 SBSQ HOSP IP/OBS MODERATE 35: CPT

## 2020-02-22 RX ADMIN — INSULIN GLARGINE 100 UNIT(S): 100 INJECTION, SOLUTION SUBCUTANEOUS at 21:14

## 2020-02-22 RX ADMIN — Medication 8: at 07:54

## 2020-02-22 RX ADMIN — Medication 25 MILLIGRAM(S): at 09:34

## 2020-02-22 RX ADMIN — GABAPENTIN 100 MILLIGRAM(S): 400 CAPSULE ORAL at 12:01

## 2020-02-22 RX ADMIN — Medication 2: at 17:35

## 2020-02-22 RX ADMIN — OLANZAPINE 2.5 MILLIGRAM(S): 15 TABLET, FILM COATED ORAL at 09:34

## 2020-02-22 RX ADMIN — Medication 15 UNIT(S): at 11:59

## 2020-02-22 RX ADMIN — ATORVASTATIN CALCIUM 40 MILLIGRAM(S): 80 TABLET, FILM COATED ORAL at 21:13

## 2020-02-22 RX ADMIN — Medication 150 MILLIGRAM(S): at 09:34

## 2020-02-22 RX ADMIN — Medication 4: at 11:59

## 2020-02-22 RX ADMIN — OLANZAPINE 15 MILLIGRAM(S): 15 TABLET, FILM COATED ORAL at 21:14

## 2020-02-22 RX ADMIN — GABAPENTIN 100 MILLIGRAM(S): 400 CAPSULE ORAL at 21:13

## 2020-02-22 RX ADMIN — GABAPENTIN 100 MILLIGRAM(S): 400 CAPSULE ORAL at 09:34

## 2020-02-22 RX ADMIN — Medication 15 UNIT(S): at 07:53

## 2020-02-22 RX ADMIN — Medication 15 UNIT(S): at 17:35

## 2020-02-22 RX ADMIN — Medication 25 MILLIGRAM(S): at 21:13

## 2020-02-22 NOTE — PROGRESS NOTE BEHAVIORAL HEALTH - RISK ASSESSMENT
Mitigation of Acute risk factors: No SI and HI, he threateners to kill people if he does not like some aspects of d/c planning, but no genuine SI and Hi. NO plan or intent to harm anyone. Sleep is better. no anxiety and depression.   Protective factors: willing to take medication , WANTS TO BE ON INPATIENT AND WANTS TO BE REFERRED TO respite,  help seeking behavior, has no permit to use a gun and no knowledge about guns use, ACCES TO HEALTH CARE  Chronic: prior psych hospitalization , hx of self injury,

## 2020-02-22 NOTE — PROGRESS NOTE ADULT - SUBJECTIVE AND OBJECTIVE BOX
chart reviewed. d/w RN earlier today. per staff pt has been noncompliant w/ diet and eating a lot of snacks.   changed pt's diet to carb restricted without snacks.   currently glucose is better controlled, 188-204 w/ new insulin regimen.  will sign off. please call if any questions.

## 2020-02-23 PROCEDURE — 99232 SBSQ HOSP IP/OBS MODERATE 35: CPT

## 2020-02-23 RX ADMIN — INSULIN GLARGINE 100 UNIT(S): 100 INJECTION, SOLUTION SUBCUTANEOUS at 21:12

## 2020-02-23 RX ADMIN — Medication 25 MILLIGRAM(S): at 09:15

## 2020-02-23 RX ADMIN — Medication 15 UNIT(S): at 12:04

## 2020-02-23 RX ADMIN — OLANZAPINE 2.5 MILLIGRAM(S): 15 TABLET, FILM COATED ORAL at 09:15

## 2020-02-23 RX ADMIN — Medication 15 UNIT(S): at 17:30

## 2020-02-23 RX ADMIN — Medication 2: at 21:12

## 2020-02-23 RX ADMIN — Medication 6: at 17:28

## 2020-02-23 RX ADMIN — TUBERCULIN PURIFIED PROTEIN DERIVATIVE 5 UNIT(S): 5 INJECTION, SOLUTION INTRADERMAL at 21:44

## 2020-02-23 RX ADMIN — Medication 25 MILLIGRAM(S): at 21:16

## 2020-02-23 RX ADMIN — Medication 4: at 12:05

## 2020-02-23 RX ADMIN — OLANZAPINE 15 MILLIGRAM(S): 15 TABLET, FILM COATED ORAL at 21:12

## 2020-02-23 RX ADMIN — Medication 150 MILLIGRAM(S): at 09:15

## 2020-02-23 RX ADMIN — GABAPENTIN 100 MILLIGRAM(S): 400 CAPSULE ORAL at 09:15

## 2020-02-23 RX ADMIN — GABAPENTIN 100 MILLIGRAM(S): 400 CAPSULE ORAL at 12:05

## 2020-02-23 RX ADMIN — ATORVASTATIN CALCIUM 40 MILLIGRAM(S): 80 TABLET, FILM COATED ORAL at 21:12

## 2020-02-23 RX ADMIN — GABAPENTIN 100 MILLIGRAM(S): 400 CAPSULE ORAL at 21:11

## 2020-02-23 RX ADMIN — Medication 15 UNIT(S): at 08:11

## 2020-02-23 RX ADMIN — Medication 4: at 08:12

## 2020-02-24 VITALS — OXYGEN SATURATION: 99 % | TEMPERATURE: 97 F | RESPIRATION RATE: 16 BRPM

## 2020-02-24 LAB
CHOLEST SERPL-MCNC: 177 MG/DL — SIGNIFICANT CHANGE UP (ref 10–199)
HDLC SERPL-MCNC: 26 MG/DL — LOW
LIPID PNL WITH DIRECT LDL SERPL: SIGNIFICANT CHANGE UP MG/DL
TOTAL CHOLESTEROL/HDL RATIO MEASUREMENT: 6.8 RATIO — SIGNIFICANT CHANGE UP (ref 3.4–9.6)
TRIGL SERPL-MCNC: 700 MG/DL — HIGH (ref 10–149)

## 2020-02-24 PROCEDURE — 99238 HOSP IP/OBS DSCHRG MGMT 30/<: CPT

## 2020-02-24 RX ORDER — METOPROLOL TARTRATE 50 MG
1 TABLET ORAL
Qty: 30 | Refills: 1
Start: 2020-02-24 | End: 2020-03-24

## 2020-02-24 RX ORDER — ROSUVASTATIN CALCIUM 5 MG/1
1 TABLET ORAL
Qty: 0 | Refills: 0 | DISCHARGE

## 2020-02-24 RX ORDER — INSULIN GLARGINE 100 [IU]/ML
0 INJECTION, SOLUTION SUBCUTANEOUS
Qty: 0 | Refills: 0 | DISCHARGE

## 2020-02-24 RX ORDER — INSULIN LISPRO 100/ML
15 VIAL (ML) SUBCUTANEOUS
Qty: 4 | Refills: 1
Start: 2020-02-24 | End: 2020-03-24

## 2020-02-24 RX ORDER — INSULIN GLARGINE 100 [IU]/ML
100 INJECTION, SOLUTION SUBCUTANEOUS
Qty: 1500 | Refills: 1
Start: 2020-02-24 | End: 2020-03-24

## 2020-02-24 RX ORDER — ASPIRIN/CALCIUM CARB/MAGNESIUM 324 MG
1 TABLET ORAL
Qty: 0 | Refills: 0 | DISCHARGE

## 2020-02-24 RX ORDER — VENLAFAXINE HCL 75 MG
1 CAPSULE, EXT RELEASE 24 HR ORAL
Qty: 0 | Refills: 0 | DISCHARGE

## 2020-02-24 RX ORDER — ASPIRIN/CALCIUM CARB/MAGNESIUM 324 MG
1 TABLET ORAL
Qty: 15 | Refills: 1
Start: 2020-02-24 | End: 2020-03-24

## 2020-02-24 RX ORDER — GABAPENTIN 400 MG/1
1 CAPSULE ORAL
Qty: 45 | Refills: 1
Start: 2020-02-24 | End: 2020-03-24

## 2020-02-24 RX ORDER — METOPROLOL TARTRATE 50 MG
1 TABLET ORAL
Qty: 0 | Refills: 0 | DISCHARGE

## 2020-02-24 RX ORDER — ASPIRIN/CALCIUM CARB/MAGNESIUM 324 MG
81 TABLET ORAL DAILY
Refills: 0 | Status: DISCONTINUED | OUTPATIENT
Start: 2020-02-24 | End: 2020-02-24

## 2020-02-24 RX ORDER — GABAPENTIN 400 MG/1
1 CAPSULE ORAL
Qty: 0 | Refills: 0 | DISCHARGE

## 2020-02-24 RX ORDER — OLANZAPINE 15 MG/1
1 TABLET, FILM COATED ORAL
Qty: 15 | Refills: 1
Start: 2020-02-24 | End: 2020-03-24

## 2020-02-24 RX ORDER — OLANZAPINE 15 MG/1
1 TABLET, FILM COATED ORAL
Qty: 0 | Refills: 0 | DISCHARGE

## 2020-02-24 RX ORDER — ATORVASTATIN CALCIUM 80 MG/1
1 TABLET, FILM COATED ORAL
Qty: 15 | Refills: 1
Start: 2020-02-24 | End: 2020-03-24

## 2020-02-24 RX ORDER — VENLAFAXINE HCL 75 MG
1 CAPSULE, EXT RELEASE 24 HR ORAL
Qty: 30 | Refills: 0
Start: 2020-02-24 | End: 2020-03-24

## 2020-02-24 RX ADMIN — Medication 25 MILLIGRAM(S): at 10:09

## 2020-02-24 RX ADMIN — Medication 150 MILLIGRAM(S): at 10:09

## 2020-02-24 RX ADMIN — GABAPENTIN 100 MILLIGRAM(S): 400 CAPSULE ORAL at 10:09

## 2020-02-24 RX ADMIN — Medication 15 UNIT(S): at 07:36

## 2020-02-24 RX ADMIN — Medication 6: at 07:35

## 2020-02-24 RX ADMIN — OLANZAPINE 2.5 MILLIGRAM(S): 15 TABLET, FILM COATED ORAL at 10:09

## 2020-02-24 NOTE — DISCHARGE NOTE BEHAVIORAL HEALTH - NSBHDCCASEMGRFT_PSY_A_CORE
Pt. enrolled in [facility].  will be assigned and pt. notified after leaves hospital.  Ph. [phone]  Pt. to call Jose Roberto Britt ph. [phone] as needed. Pt. enrolled in [facility].  will be assigned and pt. notified after leaves hospital.  Ph. [phone]  Pt. to call supervisor Esther Alarcon as needed ph. [phone] as needed.

## 2020-02-24 NOTE — PROGRESS NOTE BEHAVIORAL HEALTH - PROBLEM SELECTOR PLAN 1
zyprexa 15mg po hs AND 2.5 mg in the AM   attending  groups for coping skills
zyprexa 15mg po hs  attending  groups for coping skills
zyprexa 15mg po hs  attending  groups for coping skills
zyprexa 15mg po hs  attenf groups for coping skills
zyprexa 15mg po hs AND 2.5 MG IN THE am   attending  groups for coping skills
zyprexa 15mg po hs  attenf groups for coping skills
zyprexa 15mg po hs AND 2.5 mg in the AM   attending  groups for coping skills
increase zyprexa 15mg po hs  attenf groups for coping skills

## 2020-02-24 NOTE — PROGRESS NOTE BEHAVIORAL HEALTH - THOUGHT CONTENT
Unremarkable
Other
Other
Unremarkable
Homicidality/Hopelessness

## 2020-02-24 NOTE — PROGRESS NOTE BEHAVIORAL HEALTH - RISK ASSESSMENT
Low Risk:   Acute risk: Pt selectively claiming suicidal or homicidal ideation or plan in the context of his wanting  a new place to live. Pt is more appreciating of the current aftercare plans and is now denying any suicidal or homicidal ideation intent or plan .   Mitigation Pt denies any  plan or intent to harm anyone or himself . Sleep is better. no anxiety and depression.   Protective factors: willing to take medication ,  help seeking behavior, has no permit to use a gun and no knowledge about guns use,   Chronic: prior psych hospitalization , hx of self injury,

## 2020-02-24 NOTE — DISCHARGE NOTE BEHAVIORAL HEALTH - MEDICATION SUMMARY - MEDICATIONS TO TAKE
I will START or STAY ON the medications listed below when I get home from the hospital:    Aspirin Enteric Coated 81 mg oral delayed release tablet  -- 1 tab(s) by mouth once a day  -- Indication: For prophylaxis for CV    gabapentin 100 mg oral capsule  -- 1 cap(s) by mouth 3 times a day  -- Indication: For Schizoaffective disorder, bipolar type    venlafaxine 150 mg oral capsule, extended release  -- 1 cap(s) by mouth once a day  -- Indication: For Schizoaffective disorder, bipolar type    Lantus 100 units/mL subcutaneous solution  -- 100 unit(s) subcutaneous once a day (at bedtime)   -- Indication: For DM    HumaLOG KwikPen 200 units/mL (Concentrated) subcutaneous solution  -- 15 unit(s) subcutaneous 3 times a day   -- Indication: For DM    atorvastatin 40 mg oral tablet  -- 1 tab(s) by mouth once a day (at bedtime)  -- Indication: For Hyperlipidemia    OLANZapine 2.5 mg oral tablet  -- 1 tab(s) by mouth once a day  -- Indication: For Schizoaffective disorder, bipolar type    OLANZapine 15 mg oral tablet  -- 1 tab(s) by mouth once a day (at bedtime)  -- Indication: For Schizoaffective disorder, bipolar type    metoprolol succinate 25 mg oral tablet, extended release  -- 1 tab(s) by mouth 2 times a day  -- Indication: For Htn    metoprolol succinate 25 mg oral tablet, extended release  -- 1 tab(s) by mouth 2 times a day  -- Indication: For Htn

## 2020-02-24 NOTE — DISCHARGE NOTE BEHAVIORAL HEALTH - NSBHDCREFEROTHERFT_PSY_A_CORE
[facility]- for psychiatric crises (available AFTER HOURS)  24/7 hotline: [phone]  Address:  [address], [place] [address]

## 2020-02-24 NOTE — DISCHARGE NOTE BEHAVIORAL HEALTH - MEDICATION SUMMARY - MEDICATIONS TO STOP TAKING
I will STOP taking the medications listed below when I get home from the hospital:    rosuvastatin 10 mg oral tablet  -- 1 tab(s) by mouth once a day    venlafaxine 75 mg oral tablet  -- 1 tab(s) by mouth once a day in addition to venlafaxine  mg daily

## 2020-02-24 NOTE — DISCHARGE NOTE BEHAVIORAL HEALTH - NSBHDCADDR2FT_A_CORE
[address] [address]   ph. [phone]  F. [phone]    Please arrive 15 minutes early with photo ID and insurance cards.

## 2020-02-24 NOTE — PROGRESS NOTE BEHAVIORAL HEALTH - NSBHADMITIPOBSFT_PSY_A_CORE
pt denies any suicidal intent or plan

## 2020-02-24 NOTE — DISCHARGE NOTE BEHAVIORAL HEALTH - NSBHDCVIOLSAFETYFT_PSY_A_CORE
Advised to return to hospital or go to nearest ED or call 911 or (688) LIFENET or [phone] 119 TALK hotlines for any severe, worsening or persistent symptoms including suicidal/homicidal ideations, intent or plans. Patient verbalized understanding of instructions.

## 2020-02-24 NOTE — PROGRESS NOTE BEHAVIORAL HEALTH - SUMMARY
Patient is a 59-year-old  male, with psychiatric history of schizo affective disorder, with multiple prior psychiatric admissions, with prior self injurious behaviors in 1980, no press suicide attempt, living in a group home, with no substance abuse, no legal problems, presents after self referral for homicidal thoughts in the setting of interpersonal conflict with roommates.    Patient presents with adjustment reaction with disturbance in mood.  However, he reports current homicidal ideations, noting he will hurt his peer if he is discharged back to his residence. Patient is preoccupied with killing his peer, and feels disenfranchised in that nothing has been done about his peer. He reports current paranoid thoughts related to "all staff" at his residence turning against him and taking his peer's side. He is not able to reliably contract for safety at this time. Patient will benefit from inpatient psychiatric admission for safety and stabilization.
Patient is a 59-year-old  male, with psychiatric history of schizo affective disorder, with multiple prior psychiatric admissions, with prior self injurious behaviors in 1980, no press suicide attempt, living in a group home, with no substance abuse, no legal problems, presents after self referral for homicidal thoughts in the setting of interpersonal conflict with roommates.  Patient presents with adjustment reaction with disturbance in mood. He reported  homicidal ideations on admission, "noting he will hurt his peer if he is discharged back to his residence".   Patient is preoccupied with getting [facility] application in place.   When informed that if he does not return to his group home he may end up gong to shelter. He responded "OK that it is shelter, but I don't want to go back".    2/2/2020 Cooperative, no SI and HI. Tolerating increase of Zyprexa well, no side effects .  Dispo planning   2/21/2020 pt is calm,. cooperative, taking his meds. willing to work on d/c plan.   2/19/2020 : No meds changes. NO agitation, PT modo si stable. No depression and no HI and SI. PT is open to idea to be d/sonal to respite and wait for another housing there.  2/20/2020 no meds changes, pt is stable. dispo planning and d/c planing.  2/23/2020  Pt continues to improve,. No agitation,. no SI and HI. he wants to be d/sonal tomorrow.
Patient is a 59-year-old  male, with psychiatric history of schizo affective disorder, with multiple prior psychiatric admissions, with prior self injurious behaviors in 1980, no press suicide attempt, living in a group home, with no substance abuse, no legal problems, presents after self referral for homicidal thoughts in the setting of interpersonal conflict with roommates.  Patient presents with adjustment reaction with disturbance in mood. He reported  homicidal ideations on admission, "noting he will hurt his peer if he is discharged back to his residence".   Patient is preoccupied with getting [facility] application in place.   When informed that if he does not return to his group home he may end up gong to shelter. He responded "OK that it is shelter, but I don't want to go back".
Patient is a 59-year-old  male, with psychiatric history of schizo affective disorder, with multiple prior psychiatric admissions, with prior self injurious behaviors in 1980, no press suicide attempt, living in a group home, with no substance abuse, no legal problems, presents after self referral for homicidal thoughts in the setting of interpersonal conflict with roommates.  Patient presents with adjustment reaction with disturbance in mood. He reported  homicidal ideations on admission, "noting he will hurt his peer if he is discharged back to his residence".   Patient is preoccupied with getting SPA application in place.   When informed that if he does not return to his group home he may end up gong to shelter. He responded "OK that it is shelter, but I don't want to go back".      2/21/2020 pt is calm,. cooperative, taking his meds. willing to work on d/c plan.   2/19/2020 : No meds changes. NO agitation, PT modo si stable. No depression and no HI and SI. PT is open to idea to be d/sonal to respite and wait for another housing there.  2/20/2020 no meds changes, pt is stable. dispo planning and d/c planing.
Patient is a 59-year-old  male, with psychiatric history of schizo affective disorder, with multiple prior psychiatric admissions, with prior self injurious behaviors in 1980, no press suicide attempt, living in a group home, with no substance abuse, no legal problems, presents after self referral for homicidal thoughts in the setting of interpersonal conflict with roommates.  Patient presents with adjustment reaction with disturbance in mood. He reported  homicidal ideations on admission, "noting he will hurt his peer if he is discharged back to his residence".   Patient is preoccupied with getting SPA application in place.   When informed that if he does not return to his group home he may end up gong to shelter. He responded "OK that it is shelter, but I don't want to go back".    2/19/2020 : No meds changes. NO agitation, PT modo si stable. No depression and no HI and SI. PT is open to idea to be d/sonal to respite and wait for another housing there.  2/20/2020 no meds changes, pt is stable. dispo planning and d/c planing.
Patient is a 59-year-old  male, with psychiatric history of schizo affective disorder, with multiple prior psychiatric admissions, with prior self injurious behaviors in 1980, no press suicide attempt, living in a group home, with no substance abuse, no legal problems, presents after self referral for homicidal thoughts in the setting of interpersonal conflict with roommates.  Patient presents with adjustment reaction with disturbance in mood. He reported  homicidal ideations on admission, "noting he will hurt his peer if he is discharged back to his residence".   Patient is preoccupied with getting SPA application in place.   When informed that if he does not return to his group home he may end up gong to shelter. He responded "OK that it is shelter, but I don't want to go back".    2/2/2020 Cooperative, no SI and HI. Tolerating increase of Zyprexa well, no side effects .  Dispo planning   2/21/2020 pt is calm,. cooperative, taking his meds. willing to work on d/c plan.   2/19/2020 : No meds changes. NO agitation, PT modo si stable. No depression and no HI and SI. PT is open to idea to be d/sonal to respite and wait for another housing there.  2/20/2020 no meds changes, pt is stable. dispo planning and d/c planing.
Patient is a 59-year-old  male, with psychiatric history of schizo affective disorder, with multiple prior psychiatric admissions, with prior self injurious behaviors in 1980, no press suicide attempt, living in a group home, with no substance abuse, no legal problems, presents after self referral for homicidal thoughts in the setting of interpersonal conflict with roommates.  Patient presents with adjustment reaction with disturbance in mood. He reported  homicidal ideations on admission, "noting he will hurt his peer if he is discharged back to his residence".   Patient is preoccupied with getting SPA application in place.   When informed that if he does not return to his group home he may end up gong to shelter. He responded "OK that it is shelter, but I don't want to go back".    2/19/2020 : No meds changes. NO agitation, PT modo si stable. No depression and no HI and SI. PT is open to idea to be d/sonal to respite and wait for another housing there.
Patient is a 59-year-old  male, with psychiatric history of schizo affective disorder, with multiple prior psychiatric admissions, with prior self injurious behaviors in 1980, no press suicide attempt, living in a group home, with no substance abuse, no legal problems, presents after self referral for homicidal thoughts in the setting of interpersonal conflict with roommates.  Patient presents with adjustment reaction with disturbance in mood. He reported  homicidal ideations on admission, "noting he will hurt his peer if he is discharged back to his residence".   Patient is preoccupied with getting SPA application in place.   When informed that if he does not return to his group home he may end up gong to shelter. He responded "OK that it is shelter, but I don't want to go back".    2/2/2020 Cooperative, no SI and HI. Tolerating increase of Zyprexa well, no side effects .  Dispo planning   2/21/2020 pt is calm,. cooperative, taking his meds. willing to work on d/c plan.   2/19/2020 : No meds changes. NO agitation, PT modo si stable. No depression and no HI and SI. PT is open to idea to be d/sonal to respite and wait for another housing there.  2/20/2020 no meds changes, pt is stable. dispo planning and d/c planing.  2/23/2020  Pt continues to improve,. No agitation,. no SI and HI. he wants to be d/sonal tomorrow.

## 2020-02-24 NOTE — PROGRESS NOTE BEHAVIORAL HEALTH - NSBHFUPINTERVALHXFT_PSY_A_CORE
Discussed on team meeting,.  PT is admitted because he wanted to change his appraisement. PT reports that he asks management for different apartment because he does not go along with his roommate. He thinks his roommate is "drug dealer" also  he threatens me", and "I told them that I am not going to kill him if they don't find me another apartment" and "I want SPA, I am waiting for SPA last 2 years".   "I don't want to kill anyone I just want SPA apartment".     IT seems tat pt made homicidal statements in order to be admitted to psychiatry unit to change his apartment. He does not seem to be genuinely  having HI, plan or intent to kill anyone.   He denies SI, Juan depression, reports compliance with his meds, ambulates with walker, no behavioral issues on the unit, Denies HSEILA, Mehreen, Pi.
NO significant changes.   Pt is lying comfortable in his bed, food eye contact. Communicates. No involuntary movements. Mood is "fine". Denies any anger, SI or HI. Denies AH, and VH. Denies PI. Focused on d/c plan for Monday.
PT IS DRESSED IN HOSPITAL GOWN, VISIBLE ON THE UNIT, AMBULATING WITH WALKER.   Pt is comfortable with gong to respite.   Today pt continues to deny depression,  anxiety, sleep or appetite problems, denies any thoughts, plan or intent to harm himself or  anyone else. He made that statement to get the attention of staff of his residence and to get another apartment.   Reports compliance with his meds.  No behavioral issues on the unit, Denies SHEILA, LOTTIE, PI.  Care coordinated with the team in team meeting.
PT interview I his room, Care coordinated with the team this AM. PT is manipulating with suicidal and homicidal threats depending on placement issues. IF he sun snot like what CSW suggests him to do, or if he does not like the location, he states that eh si gong to kill everyone and if he likes the alternative he states that he would never kill anyone.   Pt states he is comfortable with going to respite.   Pt continues to deny depression,  anxiety, sleep or appetite problems, denies any thoughts, plan or intent to harm himself or  anyone else.   Reports compliance with his meds.  No behavioral issues on the unit, Denies LOTTIE SOSA, PI.
PT interviewed in his room. Lying comfortable in his bed, fair eye contact. Communicates. No involuntary movements. Mood is "better". Denies any anger, SI or HI. Denies AH, and VH. Denies PI. Focused on d/c plan for Monday.
PT is admitted because he wanted to change his apartment.   He had a meeting with CSW and it seems that eh likes idea of getting into a respite until final solution for his housing is found.   Today pt clearly denies any thoughts, plan or intent to harm anyone. He made that statement to get the attention of staff of his residence and to get another apartment.   He denies SI, Juan depression, reports compliance with his meds, ambulates with walker, no behavioral issues on the unit, Denies , Mehreen, Pi.  Care coordinated with the team in team meeting.
Pt with good eye contact Alert Pt denies any suicidal ideation intent or plan Pt with denial of any side effects from medication Pt with denial any hallucination . No delusions elicited. ., Pt with no new issues and now with denial of any suicidal or homicidal ideation as these were in the context of needing a new place to live as the pt was feeling intimidated by another pt living at his supervised housing. Pt saying that he is "hopeful" of the aftercare planning and that he is willing to give the aftercare a chance. Pt denies any thoughts of self injury .

## 2020-02-24 NOTE — PROGRESS NOTE BEHAVIORAL HEALTH - NSBHADMITDANGERSELF_PSY_A_CORE
suicidal ideation with plan and means/unable to care for self
unable to care for self/suicidal ideation with plan and means
suicidal ideation with plan and means/unable to care for self
unable to care for self/suicidal ideation with plan and means
suicidal ideation with plan and means/unable to care for self
suicidal ideation with plan and means/unable to care for self

## 2020-02-24 NOTE — DISCHARGE NOTE BEHAVIORAL HEALTH - NSBHDCCRISISPROB1FT_PSY_A_CORE
Any thoughts to harm/kill yourself or others or the return or worsening of any of the acute symptoms you had prior to admission to the hospital.

## 2020-02-24 NOTE — DISCHARGE NOTE BEHAVIORAL HEALTH - CARE PROVIDER_API CALL
[facility],   Phone: (   )    -  Fax: (   )    -  Follow Up Time:

## 2020-02-24 NOTE — DISCHARGE NOTE BEHAVIORAL HEALTH - CONDITIONS AT DISCHARGE
Patient alert, oriented x3, pleasant, and cooperative. Patient denies thoughts to hurt himself or others. Nurse and  reviewed discharge plan with patient who accepted and is in agreement with plan. Patient given a copy of his discharge paperwork. Patient appropriately dressed for discharge.

## 2020-02-24 NOTE — DISCHARGE NOTE BEHAVIORAL HEALTH - NSBHDCSUBSTHXFT_PSY_A_CORE
Pt. denies past/present substance abuse/dependence. He endorsed occasionally drinking 1-2 beers socially.

## 2020-02-24 NOTE — DISCHARGE NOTE BEHAVIORAL HEALTH - NS TRANSFER PATIENT BELONGINGS
Other belongings/Cell Phone/PDA (specify)/wallet, debit card, social security card, [place] benefit card, ID card, Medicare card, Silver key, and gold key

## 2020-02-24 NOTE — DISCHARGE NOTE BEHAVIORAL HEALTH - NSBHDCSUICSAFETYFT_PSY_A_CORE
Advised to return to hospital or go to nearest ED or call 911 or (191) LIFENET or [phone] TALK hotlines for any severe, worsening or persistent symptoms including suicidal/homicidal ideations, intent or plans. Patient verbalized understanding of instructions.

## 2020-02-24 NOTE — DISCHARGE NOTE BEHAVIORAL HEALTH - NSBHDCADDFT_PSY_A_CORE
02-17 DgmporwtmoX2I 11.0  Ventricular Rate 83 BPM    Atrial Rate 83 BPM    P-R Interval 172 ms    QRS Duration 148 ms    Q-T Interval 414 ms    QTC Calculation(Bezet) 486 ms    P Axis 55 degrees    R Axis -29 degrees    T Axis 53 degrees    Diagnosis Line Normal sinus rhythm  Right bundle branch block  Abnormal ECG  When compared with ECG of 05-FEB-2020 10:52,  No significant change was found  Confirmed by Yury Martines MD ([id number]) on 2/16/2020 1:24:09 PM

## 2020-02-24 NOTE — DISCHARGE NOTE BEHAVIORAL HEALTH - NSBHDCCRISISPLAN1FT_PSY_A_CORE
Tell a trusted friend/family member, tell housing staff, tell clinic staff, call a crisis line ( Crisis Center ph. [phone], [facility] DASH [phone], [facility] (peer support) ph. [phone]), return to the nearest emergency room. You may call 9-1-1 for assistance.

## 2020-02-24 NOTE — PROGRESS NOTE BEHAVIORAL HEALTH - NSBHCHARTREVIEWINVESTIGATE_PSY_A_CORE FT
Ventricular Rate 83 BPM    Atrial Rate 83 BPM    P-R Interval 172 ms    QRS Duration 148 ms    Q-T Interval 414 ms    QTC Calculation(Bezet) 486 ms    P Axis 55 degrees    R Axis -29 degrees    T Axis 53 degrees    Diagnosis Line Normal sinus rhythm  Right bundle branch block  Abnormal ECG  When compared with ECG of 05-FEB-2020 10:52,  No significant change was found  Confirmed by Yury Martines MD ([id number]) on 2/16/2020 1:24:09 PM

## 2020-02-24 NOTE — DISCHARGE NOTE BEHAVIORAL HEALTH - NSBHDCTHERAPYFT_PSY_A_CORE
Individual therapy, group therapy, medication management, safety planning with 15 min safety checks, discharge planning with family involvement as needed. hard copy, drawn during this pregnancy

## 2020-02-24 NOTE — DISCHARGE NOTE BEHAVIORAL HEALTH - NSBHDCDXVALIDYESFT_PSY_A_CORE
pt with depression and suicidal ideation but denies any intent or plan, the homicidal and suicidal ideation in the context of needing a new place to live and now the pt is referred to a new place to live.

## 2020-02-24 NOTE — DISCHARGE NOTE BEHAVIORAL HEALTH - NSBHDCADDR1FT_A_CORE
[address] [place] [address]  Appt. scheduled for Thurs. 2/27/20 at 10:15am with Shae Tobin.    Please arrive 15 minutes early to appt. with photo ID and insurance card.

## 2020-02-24 NOTE — PROGRESS NOTE BEHAVIORAL HEALTH - NSBHCHARTREVIEWVS_PSY_A_CORE FT
Vital Signs Last 24 Hrs  T(C): 36.2 (24 Feb 2020 07:27), Max: 36.2 (24 Feb 2020 07:27)  T(F): 97.2 (24 Feb 2020 07:27), Max: 97.2 (24 Feb 2020 07:27)  HR: --  BP: --  BP(mean): --  RR: 16 (24 Feb 2020 07:27) (16 - 16)  SpO2: 99% (24 Feb 2020 07:27) (99% - 99%)
Vital Signs Last 24 Hrs  T(C): 35.6 (21 Feb 2020 07:57), Max: 35.6 (21 Feb 2020 07:57)  T(F): 96.1 (21 Feb 2020 07:57), Max: 96.1 (21 Feb 2020 07:57)  HR: -- 81 sit and 85 stand  BP: -- 111/74 sit and 95/67 stand  BP(mean): --  RR: 14 (21 Feb 2020 07:57) (14 - 14)  SpO2: 98% (21 Feb 2020 07:57) (98% - 98%)
Vital Signs Last 24 Hrs  T(C): 36.1 (20 Feb 2020 08:09), Max: 36.1 (20 Feb 2020 08:09)  T(F): 97 (20 Feb 2020 08:09), Max: 97 (20 Feb 2020 08:09)    RR: 16 (20 Feb 2020 08:09) (16 - 16)  SpO2: 98% (20 Feb 2020 08:09) (98% - 98%)
Vital Signs Last 24 Hrs  T(C): 36.2 (22 Feb 2020 07:57), Max: 36.2 (22 Feb 2020 07:57)  T(F): 97.1 (22 Feb 2020 07:57), Max: 97.1 (22 Feb 2020 07:57)  HR: -- 81 siot and 82 stand   BP: -- 153/98 sit and 156/80 stand   BP(mean): --  RR: 16 (22 Feb 2020 07:57) (16 - 16)  SpO2: 98% (22 Feb 2020 07:57) (98% - 98%)
Vital Signs Last 24 Hrs  T(C): 36.3 (18 Feb 2020 07:49), Max: 36.3 (18 Feb 2020 07:49)  T(F): 97.4 (18 Feb 2020 07:49), Max: 97.4 (18 Feb 2020 07:49)    RR: 16 (18 Feb 2020 07:49) (16 - 16)  SpO2: 100% (18 Feb 2020 07:49) (100% - 100%)
Vital Signs Last 24 Hrs  T(C): 36.3 (19 Feb 2020 08:19), Max: 36.3 (19 Feb 2020 08:19)  T(F): 97.4 (19 Feb 2020 08:19), Max: 97.4 (19 Feb 2020 08:19)  HR: 92 (18 Feb 2020 23:50) (92 - 92)  BP: 155/84 (18 Feb 2020 23:50) (155/84 - 155/84)  BP(mean): --  RR: 14 (19 Feb 2020 08:19) (14 - 14)  SpO2: 97% (19 Feb 2020 08:19) (97% - 97%)
Vital Signs Last 24 Hrs  T(C): 36.3 (23 Feb 2020 07:59), Max: 36.3 (23 Feb 2020 07:59)  T(F): 97.4 (23 Feb 2020 07:59), Max: 97.4 (23 Feb 2020 07:59)  HR: --  79 sit and 89 stand  BP: -- 140/74 sit and 115/54     RR: 16 (23 Feb 2020 07:59) (16 - 16)  SpO2: 99% (23 Feb 2020 07:59) (99% - 99%)

## 2020-02-24 NOTE — DISCHARGE NOTE BEHAVIORAL HEALTH - NSBHDCRESOURCESOTHERFT_PSY_A_CORE
[facility]: Ximena Bradley, ph. [phone]  [facility]: Patrizia, [phone] [facility] respite counselor: Ximena Bradley, ph. [phone]  [facility] : Brooke Desai ph. [phone] ext. [phone]  [facility]: Patrizia [phone]

## 2020-02-24 NOTE — DISCHARGE NOTE BEHAVIORAL HEALTH - MEDICATION SUMMARY - MEDICATIONS TO CHANGE
I will SWITCH the dose or number of times a day I take the medications listed below when I get home from the hospital:    Lantus 100 units/mL subcutaneous solution  -- 80 units subq daily at bedtime

## 2020-02-24 NOTE — DISCHARGE NOTE BEHAVIORAL HEALTH - NSBHDCRESPONSEFT_PSY_A_CORE
improved with the pt denies any suicidal or homicidal ideation intent or plan Pt with euthymic mood and affect is full and mood congruent Pt is agreeing to the new placement and is denying any suicidal or homicidal ideation intent or plan . Pt is calm , no distress and is happy with the aftercare appts.

## 2020-02-24 NOTE — PROGRESS NOTE BEHAVIORAL HEALTH - AXIS III
HTN (hypertension)  Diabetes mellitus

## 2020-02-24 NOTE — DISCHARGE NOTE BEHAVIORAL HEALTH - NSBHDCVIOLFCTRMIT_PSY_A_CORE
1. depression - Pt is directed to continue with all medications until directed by his MD to change or discontinued with medications. pt referred for psychotherapy for insight and coping skills. , and new place to live  2. Homicial ideation - pt denies any homicidal  ideation, intent  or plan , pt with future plans, pt indicated no knowledge how to operate a firearm  pt referred to family service league for support and treatment ideation only in the context of needing a new place to live

## 2020-02-24 NOTE — PROGRESS NOTE BEHAVIORAL HEALTH - NSBHFUPINTERVALCCFT_PSY_A_CORE
I AM BETTER
I AM BETTER    As his baseline, pt is manipulating with suicidal and homicidal threats depending on placement issues. IF he does not like what CSW suggests him to do, or if he does not like the location, he states that eh si gong to kill everyone and if he likes the alternative he states that he would never kill anyone.
I am OK
I am fine     As his baseline, pt is manipulating with suicidal and homicidal threats depending on placement issues. IF he does not like what CSW suggests him to do, or if he does not like the location, he states that eh si gong to kill everyone and if he likes the alternative he states that he would never kill anyone.
I want SPA , I don't want to gob back to that place
IT is good, we got is resolved, I can leave . I am gong to respite until there is better living arrangement for me.
"I'm feeling better and want to get to a new placement"

## 2020-02-24 NOTE — PROGRESS NOTE BEHAVIORAL HEALTH - BODY HABITUS
Well nourished/Overweight
Overweight/Well nourished
Well nourished/Overweight
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
## 2020-02-24 NOTE — PROGRESS NOTE BEHAVIORAL HEALTH - NSBHCHARTREVIEWLAB_PSY_A_CORE FT
Hemoglobin A1C, Whole Blood: 11.0: Method: Immunoassay       Reference Range                4.0-5.6%       High risk (prediabetic)        5.7-6.4%       Diabetic, diagnostic             >=6.5%       ADA diabetic treatment goal       <7.0%  The Hemoglobin A1c testing is NGSP-certified.Reference ranges are based  upon the 2010 recommendations of  the American Diabetes Association.  Interpretation may vary for children  and adolescents. % (02.17.20 @ 09:37)

## 2020-02-24 NOTE — DISCHARGE NOTE BEHAVIORAL HEALTH - NSBHDCMEDSFT_PSY_A_CORE
MEDICATIONS  (STANDING):  atorvastatin 40 milliGRAM(s) Oral at bedtime  gabapentin 100 milliGRAM(s) Oral three times a day  insulin glargine Injectable (LANTUS) 100 Unit(s) SubCutaneous at bedtime  insulin lispro (HumaLOG) corrective regimen sliding scale   SubCutaneous three times a day before meals  insulin lispro (HumaLOG) corrective regimen sliding scale   SubCutaneous at bedtime  insulin lispro Injectable (HumaLOG) 15 Unit(s) SubCutaneous three times a day with meals  metoprolol succinate ER 25 milliGRAM(s) Oral two times a day  OLANZapine 15 milliGRAM(s) Oral at bedtime  OLANZapine 2.5 milliGRAM(s) Oral daily  venlafaxine  milliGRAM(s) Oral daily  Pt with denial of anysuicidal ideation intent or plan Pt  also denies any homicidal ideation intent or plan Pt with euthymic mood and afull affect .  No delusion elicited

## 2020-02-24 NOTE — DISCHARGE NOTE BEHAVIORAL HEALTH - NSBHDCADDR3FT_A_CORE
[address] [place] [address]    Pt. scheduled his own primary care appointment with Dr. Bello prior to admission for Mon. 3/9/20 at 11am. He plans to follow up with diabetes needs at this appointment.

## 2020-02-24 NOTE — DISCHARGE NOTE BEHAVIORAL HEALTH - FAMILY HISTORY OF PSYCHIATRIC ILLNESS
Pt. single, never , no dependents, currently not working on disability. [place] 2 brothers and parents, obtained GED. Pt. denied hx. of abuse. He identifies brother Eliezer as supportives (ph. [phone]). He reports that mother has a "nervous breakdown" in childhood but denied any other family hx.

## 2020-02-24 NOTE — DISCHARGE NOTE BEHAVIORAL HEALTH - NSBHDCADVDIRFT_PSY_A_CORE
Pt. initially interested but declined when offered to complete advanced directive. Declined at discharge.

## 2020-02-24 NOTE — DISCHARGE NOTE BEHAVIORAL HEALTH - NSBHDCCRISISPLAN2FT_PSY_A_CORE
Call [facility] 5N: [phone]  [facility]- Uzma Haines [place]  Psychiatrist- Dr. Reaves

## 2020-02-24 NOTE — DISCHARGE NOTE BEHAVIORAL HEALTH - NSBHDCHOUSINGFT_PSY_A_CORE
Pt. will be housed in [facility] respite: [address] [place] [id number]. Contact: Uzma Young [phone] ext. [phone]  [facility] staff will arrange for future transfer to new supported apartment as pt. will not be returning to prior supported apt. address at [address] [facility].    Pt. also completed new [facility] application to be considered for future senior [facility]PA housing opportunities.

## 2020-02-24 NOTE — DISCHARGE NOTE BEHAVIORAL HEALTH - NSBHDCSUICFCTRMIT_PSY_A_CORE
1. depression - Pt is directed to continue with all medications until directed by his MD to change or discontinued with medications  2. suicidal ideation - pt denies any suicidal ideation, intent  or plan , pt with future plans, pt indicated no knowledge how to operate a firearm

## 2020-02-24 NOTE — DISCHARGE NOTE BEHAVIORAL HEALTH - NSBHDCSWCOMMENTSFT_PSY_A_CORE
Pt. was educated on the importance of taking medications as prescribed and to not stop or miss any doses unless directed by prescribing provider. Pt. was counseled on the importance of attending outpatient appointments for ongoing development of coping skills in particular. Pt.  was made aware of crisis resources to use after hours as needed

## 2020-02-24 NOTE — DISCHARGE NOTE BEHAVIORAL HEALTH - NSBHDCHOUSING_PSY_A_CORE
[facility] Respite pt. cell: [phone]/other... [facility] pt. cell: [phone]/other...

## 2020-03-02 DIAGNOSIS — I10 ESSENTIAL (PRIMARY) HYPERTENSION: ICD-10-CM

## 2020-03-02 DIAGNOSIS — E11.40 TYPE 2 DIABETES MELLITUS WITH DIABETIC NEUROPATHY, UNSPECIFIED: ICD-10-CM

## 2020-03-02 DIAGNOSIS — R07.89 OTHER CHEST PAIN: ICD-10-CM

## 2020-03-02 DIAGNOSIS — F25.0 SCHIZOAFFECTIVE DISORDER, BIPOLAR TYPE: ICD-10-CM

## 2020-03-02 DIAGNOSIS — I25.10 ATHEROSCLEROTIC HEART DISEASE OF NATIVE CORONARY ARTERY WITHOUT ANGINA PECTORIS: ICD-10-CM

## 2020-03-02 DIAGNOSIS — E11.65 TYPE 2 DIABETES MELLITUS WITH HYPERGLYCEMIA: ICD-10-CM

## 2020-03-02 DIAGNOSIS — E78.5 HYPERLIPIDEMIA, UNSPECIFIED: ICD-10-CM

## 2020-03-02 DIAGNOSIS — R45.851 SUICIDAL IDEATIONS: ICD-10-CM

## 2020-03-02 DIAGNOSIS — I45.10 UNSPECIFIED RIGHT BUNDLE-BRANCH BLOCK: ICD-10-CM

## 2020-03-02 DIAGNOSIS — R45.850 HOMICIDAL IDEATIONS: ICD-10-CM

## 2020-03-02 DIAGNOSIS — Z79.4 LONG TERM (CURRENT) USE OF INSULIN: ICD-10-CM

## 2022-06-10 ENCOUNTER — EMERGENCY (EMERGENCY)
Facility: HOSPITAL | Age: 62
LOS: 0 days | Discharge: HOME | End: 2022-06-10
Attending: EMERGENCY MEDICINE
Payer: MEDICARE

## 2022-06-10 VITALS
SYSTOLIC BLOOD PRESSURE: 129 MMHG | TEMPERATURE: 98 F | OXYGEN SATURATION: 100 % | DIASTOLIC BLOOD PRESSURE: 81 MMHG | HEART RATE: 100 BPM | RESPIRATION RATE: 16 BRPM

## 2022-06-10 VITALS
DIASTOLIC BLOOD PRESSURE: 91 MMHG | OXYGEN SATURATION: 100 % | HEIGHT: 73 IN | TEMPERATURE: 98 F | WEIGHT: 257.94 LBS | RESPIRATION RATE: 18 BRPM | HEART RATE: 96 BPM | SYSTOLIC BLOOD PRESSURE: 146 MMHG
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
## 2022-06-10 DIAGNOSIS — Z20.822 CONTACT WITH AND (SUSPECTED) EXPOSURE TO COVID-19: ICD-10-CM

## 2022-06-10 DIAGNOSIS — Z79.82 LONG TERM (CURRENT) USE OF ASPIRIN: ICD-10-CM

## 2022-06-10 DIAGNOSIS — I10 ESSENTIAL (PRIMARY) HYPERTENSION: ICD-10-CM

## 2022-06-10 DIAGNOSIS — I73.9 PERIPHERAL VASCULAR DISEASE, UNSPECIFIED: ICD-10-CM

## 2022-06-10 DIAGNOSIS — M25.561 PAIN IN RIGHT KNEE: ICD-10-CM

## 2022-06-10 DIAGNOSIS — M25.562 PAIN IN LEFT KNEE: ICD-10-CM

## 2022-06-10 DIAGNOSIS — E11.9 TYPE 2 DIABETES MELLITUS WITHOUT COMPLICATIONS: ICD-10-CM

## 2022-06-10 DIAGNOSIS — M17.0 BILATERAL PRIMARY OSTEOARTHRITIS OF KNEE: ICD-10-CM

## 2022-06-10 DIAGNOSIS — S60.221A CONTUSION OF RIGHT HAND, INITIAL ENCOUNTER: ICD-10-CM

## 2022-06-10 DIAGNOSIS — S60.222A CONTUSION OF LEFT HAND, INITIAL ENCOUNTER: ICD-10-CM

## 2022-06-10 DIAGNOSIS — M19.90 UNSPECIFIED OSTEOARTHRITIS, UNSPECIFIED SITE: ICD-10-CM

## 2022-06-10 DIAGNOSIS — F25.9 SCHIZOAFFECTIVE DISORDER, UNSPECIFIED: ICD-10-CM
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
## 2022-06-10 PROCEDURE — U0003: CPT

## 2022-06-10 PROCEDURE — 99283 EMERGENCY DEPT VISIT LOW MDM: CPT

## 2022-06-10 PROCEDURE — U0005: CPT

## 2022-06-10 NOTE — ED PROVIDER NOTE - NEUROLOGICAL, MLM
Alert and oriented, no focal deficits, no motor or sensory deficits.  CNs grossly normal, speech normal.

## 2022-06-10 NOTE — ED PROVIDER NOTE - CONSTITUTIONAL, MLM
normal... Older WM awake, alert, oriented to person, place, time/situation and in no apparent distress.

## 2022-06-10 NOTE — ED ADULT NURSE REASSESSMENT NOTE - NS ED NURSE REASSESS COMMENT FT1
pt to be d/c, given d/c instructions. pt requesting medicaid cab for transportation, ED unit clerk to set up for transportation home.

## 2022-06-10 NOTE — ED PROVIDER NOTE - NSICDXPASTMEDICALHX_GEN_ALL_CORE_FT
PAST MEDICAL HISTORY:  Diabetes mellitus     HTN (hypertension)     OA (osteoarthritis)     PVD (peripheral vascular disease)     Schizoaffective disorder

## 2022-06-10 NOTE — ED PROVIDER NOTE - CARE PLAN
1 Principal Discharge DX:	Contusion of hand, initial encounter  Secondary Diagnosis:	Arthritis of both knees  Secondary Diagnosis:	Fall from standing, initial encounter
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
## 2022-06-10 NOTE — ED PROVIDER NOTE - PSYCHIATRIC, MLM
Alert and oriented to person, place, time/situation. normal mood and affect. no apparent risk to self or others.  + Cooperative.

## 2022-06-10 NOTE — ED PROVIDER NOTE - ENMT, MLM
NC/AT.  Airway patent, Nasal mucosa clear. Mouth with slightly dry mucosa. Throat has no vesicles, no oropharyngeal exudates and uvula is midline.  No Yang;'s.

## 2022-06-10 NOTE — ED PROVIDER NOTE - PATIENT PORTAL LINK FT
You can access the FollowMyHealth Patient Portal offered by [facility] by registering at the following website: [email or web address]. By joining [facility]’s FollowMyHealth portal, you will also be able to view your health information using other applications (apps) compatible with our system.

## 2022-06-10 NOTE — ED PROVIDER NOTE - NSPTACCESSSVCSAPPTDETAILS_ED_ALL_ED_FT
Ortho (adv. arhtritis with diff. walking), vascular (PVD)  Pt no longer has PCP Ortho (adv. arthritis with diff. walking), vascular (PVD)  Pt no longer has PCP
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
## 2022-06-10 NOTE — ED ADULT NURSE NOTE - NSIMPLEMENTINTERV_GEN_ALL_ED
Implemented All Fall Risk Interventions:  [place] to call system. Call bell, personal items and telephone within reach. Instruct patient to call for assistance. Room bathroom lighting operational. Non-slip footwear when patient is off stretcher. Physically safe environment: no spills, clutter or unnecessary equipment. Stretcher in lowest position, wheels locked, appropriate side rails in place. Provide visual cue, wrist band, yellow gown, etc. Monitor gait and stability. Monitor for mental status changes and reorient to person, place, and time. Review medications for side effects contributing to fall risk. Reinforce activity limits and safety measures with patient and family.

## 2022-06-10 NOTE — ED PROVIDER NOTE - MUSCULOSKELETAL, MLM
Spine appears normal, range of motion is not limited, no muscle or joint tenderness.  Neck: NT, supple w/o pain.  Back, chest wall, pelvis: NT & stable.  MCKEON x 4, no focal extremity deformity, swelling, nor tenderness.  B/L SLR 40 degrees without pain, + normal motor.  B/L knees: + arthritic changes, no focal tender nor effusion, jts stable.   B/L palms: no focal swelling/deformity/discoloration nor tenderness; digits move well, + normal motor/sensory exam.

## 2022-06-10 NOTE — ED PROVIDER NOTE - CLINICAL SUMMARY MEDICAL DECISION MAKING FREE TEXT BOX
62 yo WM, PMH of HTN, DM, schizoaffective d/o, OA, BIBA s/p mechanical fall upon his outstretched hands & knees for evaluation.  VSS.  P/E unremarkable except for superf. abrasions to B/L knees, + arthritic changes, NT & stable; B/L hands normal exam.  Plan: Pt passed ambulation trial.  D/C

## 2022-06-10 NOTE — ED PROVIDER NOTE - NSFOLLOWUPINSTRUCTIONS_ED_ALL_ED_FT
Continue your regular medications as per routine.  Use cane for assisted ambulation.  Tylenol 325 mg 2 tabs every 6 hours as needed for aches & pains.

## 2022-06-13 DIAGNOSIS — Z78.9 OTHER SPECIFIED HEALTH STATUS: ICD-10-CM

## 2022-06-13 DIAGNOSIS — F32.A DEPRESSION, UNSPECIFIED: ICD-10-CM

## 2022-06-13 DIAGNOSIS — I10 ESSENTIAL (PRIMARY) HYPERTENSION: ICD-10-CM

## 2022-06-13 DIAGNOSIS — E78.5 HYPERLIPIDEMIA, UNSPECIFIED: ICD-10-CM

## 2022-06-13 DIAGNOSIS — G62.9 POLYNEUROPATHY, UNSPECIFIED: ICD-10-CM

## 2022-06-13 DIAGNOSIS — Z89.429 ACQUIRED ABSENCE OF OTHER TOE(S), UNSPECIFIED SIDE: ICD-10-CM

## 2022-06-13 DIAGNOSIS — M19.90 UNSPECIFIED OSTEOARTHRITIS, UNSPECIFIED SITE: ICD-10-CM

## 2022-06-13 DIAGNOSIS — E11.42 TYPE 2 DIABETES MELLITUS WITH DIABETIC POLYNEUROPATHY: ICD-10-CM

## 2022-06-13 DIAGNOSIS — I50.9 HEART FAILURE, UNSPECIFIED: ICD-10-CM

## 2022-06-13 DIAGNOSIS — Z99.89 DEPENDENCE ON OTHER ENABLING MACHINES AND DEVICES: ICD-10-CM

## 2022-06-13 DIAGNOSIS — I25.10 ATHEROSCLEROTIC HEART DISEASE OF NATIVE CORONARY ARTERY W/OUT ANGINA PECTORIS: ICD-10-CM

## 2022-06-13 DIAGNOSIS — Z95.5 PRESENCE OF CORONARY ANGIOPLASTY IMPLANT AND GRAFT: ICD-10-CM

## 2022-06-13 DIAGNOSIS — Z86.39 PERSONAL HISTORY OF OTHER ENDOCRINE, NUTRITIONAL AND METABOLIC DISEASE: ICD-10-CM

## 2022-06-13 DIAGNOSIS — I25.2 OLD MYOCARDIAL INFARCTION: ICD-10-CM

## 2022-06-13 DIAGNOSIS — Z79.4 TYPE 2 DIABETES MELLITUS W/OUT COMPLICATIONS: ICD-10-CM

## 2022-06-13 DIAGNOSIS — E11.9 TYPE 2 DIABETES MELLITUS W/OUT COMPLICATIONS: ICD-10-CM

## 2022-06-13 RX ORDER — CLOPIDOGREL 75 MG/1
75 TABLET, FILM COATED ORAL DAILY
Qty: 90 | Refills: 3 | Status: ACTIVE | COMMUNITY

## 2022-06-13 RX ORDER — INSULIN LISPRO 100 [IU]/ML
100 INJECTION, SOLUTION INTRAVENOUS; SUBCUTANEOUS
Refills: 0 | Status: ACTIVE | COMMUNITY

## 2022-06-13 RX ORDER — VENLAFAXINE HCL 75 MG
75 TABLET ORAL DAILY
Refills: 0 | Status: ACTIVE | COMMUNITY

## 2022-06-13 RX ORDER — ATORVASTATIN CALCIUM 80 MG/1
80 TABLET, FILM COATED ORAL DAILY
Qty: 90 | Refills: 0 | Status: ACTIVE | COMMUNITY

## 2022-06-13 RX ORDER — OLANZAPINE 10 MG/1
10 TABLET, FILM COATED ORAL DAILY
Refills: 0 | Status: ACTIVE | COMMUNITY

## 2022-06-13 RX ORDER — GABAPENTIN 100 MG
100 TABLET ORAL 3 TIMES DAILY
Refills: 0 | Status: ACTIVE | COMMUNITY

## 2022-06-13 RX ORDER — SACUBITRIL AND VALSARTAN 24; 26 MG/1; MG/1
24-26 TABLET, FILM COATED ORAL TWICE DAILY
Qty: 180 | Refills: 3 | Status: ACTIVE | COMMUNITY

## 2022-06-13 RX ORDER — METFORMIN HYDROCHLORIDE 500 MG/1
500 TABLET, COATED ORAL
Qty: 90 | Refills: 0 | Status: ACTIVE | COMMUNITY

## 2022-06-14 ENCOUNTER — APPOINTMENT (OUTPATIENT)
Dept: FAMILY MEDICINE | Facility: CLINIC | Age: 62
End: 2022-06-14

## 2023-07-09 NOTE — PROGRESS NOTE BEHAVIORAL HEALTH - NS ED BHA MED ROS CONSTITUTIONAL SYMPTOMS
No complaints
yes

## 2025-04-12 ENCOUNTER — INPATIENT (INPATIENT)
Facility: HOSPITAL | Age: 65
LOS: 3 days | Discharge: NURSING, REHAB OR LONG-TERM CARE | DRG: 282 | End: 2025-04-16
Attending: INTERNAL MEDICINE | Admitting: STUDENT IN AN ORGANIZED HEALTH CARE EDUCATION/TRAINING PROGRAM
Payer: MEDICARE

## 2025-04-12 VITALS
SYSTOLIC BLOOD PRESSURE: 126 MMHG | OXYGEN SATURATION: 97 % | WEIGHT: 259.93 LBS | HEART RATE: 82 BPM | HEIGHT: 73 IN | DIASTOLIC BLOOD PRESSURE: 76 MMHG | TEMPERATURE: 99 F | RESPIRATION RATE: 18 BRPM

## 2025-04-12 LAB
ALBUMIN SERPL ELPH-MCNC: 2.5 G/DL — LOW (ref 3.3–5.2)
ALP SERPL-CCNC: 94 U/L — SIGNIFICANT CHANGE UP (ref 40–120)
ALT FLD-CCNC: 7 U/L — SIGNIFICANT CHANGE UP
ANION GAP SERPL CALC-SCNC: 12 MMOL/L — SIGNIFICANT CHANGE UP (ref 5–17)
APTT BLD: 34.6 SEC — SIGNIFICANT CHANGE UP (ref 24.5–35.6)
AST SERPL-CCNC: 11 U/L — SIGNIFICANT CHANGE UP
BASOPHILS # BLD AUTO: 0.12 K/UL — SIGNIFICANT CHANGE UP (ref 0–0.2)
BASOPHILS NFR BLD AUTO: 1.5 % — SIGNIFICANT CHANGE UP (ref 0–2)
BILIRUB SERPL-MCNC: 0.4 MG/DL — SIGNIFICANT CHANGE UP (ref 0.4–2)
BUN SERPL-MCNC: 11.5 MG/DL — SIGNIFICANT CHANGE UP (ref 8–20)
CALCIUM SERPL-MCNC: 8.3 MG/DL — LOW (ref 8.4–10.5)
CHLORIDE SERPL-SCNC: 102 MMOL/L — SIGNIFICANT CHANGE UP (ref 96–108)
CO2 SERPL-SCNC: 23 MMOL/L — SIGNIFICANT CHANGE UP (ref 22–29)
CREAT SERPL-MCNC: 0.77 MG/DL — SIGNIFICANT CHANGE UP (ref 0.5–1.3)
EGFR: 101 ML/MIN/1.73M2 — SIGNIFICANT CHANGE UP
EGFR: 101 ML/MIN/1.73M2 — SIGNIFICANT CHANGE UP
EOSINOPHIL # BLD AUTO: 0.17 K/UL — SIGNIFICANT CHANGE UP (ref 0–0.5)
EOSINOPHIL NFR BLD AUTO: 2.2 % — SIGNIFICANT CHANGE UP (ref 0–6)
GLUCOSE SERPL-MCNC: 96 MG/DL — SIGNIFICANT CHANGE UP (ref 70–99)
HCT VFR BLD CALC: 31.6 % — LOW (ref 39–50)
HGB BLD-MCNC: 9.9 G/DL — LOW (ref 13–17)
IMM GRANULOCYTES # BLD AUTO: 0.02 K/UL — SIGNIFICANT CHANGE UP (ref 0–0.07)
IMM GRANULOCYTES NFR BLD AUTO: 0.3 % — SIGNIFICANT CHANGE UP (ref 0–0.9)
INR BLD: 1.04 RATIO — SIGNIFICANT CHANGE UP (ref 0.85–1.16)
LYMPHOCYTES # BLD AUTO: 1.12 K/UL — SIGNIFICANT CHANGE UP (ref 1–3.3)
LYMPHOCYTES NFR BLD AUTO: 14.4 % — SIGNIFICANT CHANGE UP (ref 13–44)
MAGNESIUM SERPL-MCNC: 1.8 MG/DL — SIGNIFICANT CHANGE UP (ref 1.6–2.6)
MCHC RBC-ENTMCNC: 26.4 PG — LOW (ref 27–34)
MCHC RBC-ENTMCNC: 31.3 G/DL — LOW (ref 32–36)
MCV RBC AUTO: 84.3 FL — SIGNIFICANT CHANGE UP (ref 80–100)
MONOCYTES # BLD AUTO: 0.43 K/UL — SIGNIFICANT CHANGE UP (ref 0–0.9)
MONOCYTES NFR BLD AUTO: 5.5 % — SIGNIFICANT CHANGE UP (ref 2–14)
NEUTROPHILS # BLD AUTO: 5.93 K/UL — SIGNIFICANT CHANGE UP (ref 1.8–7.4)
NEUTROPHILS NFR BLD AUTO: 76.1 % — SIGNIFICANT CHANGE UP (ref 43–77)
NRBC # BLD AUTO: 0 K/UL — SIGNIFICANT CHANGE UP (ref 0–0)
NRBC # FLD: 0 K/UL — SIGNIFICANT CHANGE UP (ref 0–0)
NRBC BLD AUTO-RTO: 0 /100 WBCS — SIGNIFICANT CHANGE UP (ref 0–0)
PLATELET # BLD AUTO: 445 K/UL — HIGH (ref 150–400)
PMV BLD: 9.2 FL — SIGNIFICANT CHANGE UP (ref 7–13)
POTASSIUM SERPL-MCNC: 3.9 MMOL/L — SIGNIFICANT CHANGE UP (ref 3.5–5.3)
POTASSIUM SERPL-SCNC: 3.9 MMOL/L — SIGNIFICANT CHANGE UP (ref 3.5–5.3)
PROT SERPL-MCNC: 5.5 G/DL — LOW (ref 6.6–8.7)
PROTHROM AB SERPL-ACNC: 12.1 SEC — SIGNIFICANT CHANGE UP (ref 9.9–13.4)
RBC # BLD: 3.75 M/UL — LOW (ref 4.2–5.8)
RBC # FLD: 16.9 % — HIGH (ref 10.3–14.5)
SODIUM SERPL-SCNC: 137 MMOL/L — SIGNIFICANT CHANGE UP (ref 135–145)
TROPONIN T, HIGH SENSITIVITY RESULT: 104 NG/L — HIGH (ref 0–51)
WBC # BLD: 7.79 K/UL — SIGNIFICANT CHANGE UP (ref 3.8–10.5)
WBC # FLD AUTO: 7.79 K/UL — SIGNIFICANT CHANGE UP (ref 3.8–10.5)

## 2025-04-12 PROCEDURE — 99285 EMERGENCY DEPT VISIT HI MDM: CPT

## 2025-04-12 PROCEDURE — 71045 X-RAY EXAM CHEST 1 VIEW: CPT | Mod: 26

## 2025-04-12 RX ORDER — IOHEXOL 350 MG/ML
30 INJECTION, SOLUTION INTRAVENOUS ONCE
Refills: 0 | Status: COMPLETED | OUTPATIENT
Start: 2025-04-12 | End: 2025-04-12

## 2025-04-12 RX ORDER — ASPIRIN 325 MG
324 TABLET ORAL ONCE
Refills: 0 | Status: COMPLETED | OUTPATIENT
Start: 2025-04-12 | End: 2025-04-12

## 2025-04-12 RX ADMIN — IOHEXOL 30 MILLILITER(S): 350 INJECTION, SOLUTION INTRAVENOUS at 22:40

## 2025-04-12 NOTE — ED ADULT NURSE NOTE - CHIEF COMPLAINT QUOTE
Pt BIBA from [facility], c/o chest pain today, also states he hasn't had proper BM in days

## 2025-04-12 NOTE — ED PROVIDER NOTE - CARE PLAN
Principal Discharge DX:	NSTEMI (non-ST elevation myocardial infarction)  Secondary Diagnosis:	Constipation

## 2025-04-12 NOTE — ED ADULT NURSE NOTE - OBJECTIVE STATEMENT
Pt AAOx4, presenting to the ED complaining of abdominal gas pain and rectal pain. Patient c/o not having a proper Bowel movement   .  Airway patent. Respirations even and unlabored on room air. NAD. CM in sinus rhythm and  @ 99% on room air. NAD.

## 2025-04-12 NOTE — ED PROVIDER NOTE - NS ED ROS FT
Constitutional: (-) fever  (-)chills  (-)sweats  Eyes/ENT: (-)   Cardiovascular: (+) chest pain, (-) palpitations (-) edema   Respiratory: (-) cough, (-) shortness of breath   Gastrointestinal: (-)nausea  (-)vomiting, (-) diarrhea  (+) abdominal pain   :  (-)dysuria, (-)frequency, (-)urgency, (-)hematuria  Musculoskeletal: (-) neck pain, (-) back pain, (-) joint pain  Integumentary: (-) rash, (-) edema  Neurological: (-) headache, (-) altered mental status  (-)LOC

## 2025-04-12 NOTE — ED ADULT NURSE NOTE - CHIEF COMPLAINT
1st attempt: lvm to schedule yearly physical.    mEy Odell on 1/4/2024 at 2:42 PM     The patient is a 64y Male complaining of chest pain.

## 2025-04-12 NOTE — ED PROVIDER NOTE - OBJECTIVE STATEMENT
64 yoM; with PMH significant for HTN, DM, schizoaffective, OA, PVD; now presenting complaining of chest pain–left-sided, nonradiating, nonexertional, nonpleuritic, associated mild palpitations and shortness of breath.  Patient states he was arguing with his nurse secondary to having rectal pain and began to have chest pain while arguing.  Patient reports having constipation over the past 3 to 4 days.  Denies any blood.  Patient complaining of rectal pain with bowel movements.  Patient complaining of abdominal pain–left lower quadrant, pressure, cramping, with no associated nausea or vomiting.  Denies fever, chills, sweats.  Denies any dizziness.

## 2025-04-12 NOTE — ED ADULT TRIAGE NOTE - CHIEF COMPLAINT QUOTE
Looping in viry as well to see if they have any availability    Pt BIBA from [facility], c/o chest pain today, also states he hasn't had proper BM in days

## 2025-04-12 NOTE — ED PROVIDER NOTE - CLINICAL SUMMARY MEDICAL DECISION MAKING FREE TEXT BOX
64 yoM; with PMH significant for HTN, DM, schizoaffective, OA, PVD; now presenting complaining of chest pain–left-sided, nonradiating, nonexertional, nonpleuritic, associated mild palpitations and shortness of breath.  Patient states he was arguing with his nurse secondary to having rectal pain and began to have chest pain while arguing.  Patient reports having constipation over the past 3 to 4 days.  Denies any blood.  Patient complaining of rectal pain with bowel movements.  Patient complaining of abdominal pain–left lower quadrant, pressure, cramping, with no associated nausea or vomiting. will evaluate chest pain for acs vs infectious etiology: labs, ekg, cardiac enzymes. ct a/p to further evaluate abd pain.

## 2025-04-13 DIAGNOSIS — I21.4 NON-ST ELEVATION (NSTEMI) MYOCARDIAL INFARCTION: ICD-10-CM

## 2025-04-13 DIAGNOSIS — I25.10 ATHEROSCLEROTIC HEART DISEASE OF NATIVE CORONARY ARTERY WITHOUT ANGINA PECTORIS: Chronic | ICD-10-CM

## 2025-04-13 DIAGNOSIS — Z89.511 ACQUIRED ABSENCE OF RIGHT LEG BELOW KNEE: Chronic | ICD-10-CM

## 2025-04-13 DIAGNOSIS — E03.9 HYPOTHYROIDISM, UNSPECIFIED: Chronic | ICD-10-CM

## 2025-04-13 PROBLEM — M19.90 UNSPECIFIED OSTEOARTHRITIS, UNSPECIFIED SITE: Chronic | Status: ACTIVE | Noted: 2022-06-14

## 2025-04-13 PROBLEM — I73.9 PERIPHERAL VASCULAR DISEASE, UNSPECIFIED: Chronic | Status: ACTIVE | Noted: 2022-06-14

## 2025-04-13 LAB
APTT BLD: 37.3 SEC — HIGH (ref 24.5–35.6)
APTT BLD: 39.8 SEC — HIGH (ref 24.5–35.6)
GLUCOSE BLDC GLUCOMTR-MCNC: 109 MG/DL — HIGH (ref 70–99)
GLUCOSE BLDC GLUCOMTR-MCNC: 125 MG/DL — HIGH (ref 70–99)
GLUCOSE BLDC GLUCOMTR-MCNC: 209 MG/DL — HIGH (ref 70–99)
GLUCOSE BLDC GLUCOMTR-MCNC: 98 MG/DL — SIGNIFICANT CHANGE UP (ref 70–99)
HCT VFR BLD CALC: 35 % — LOW (ref 39–50)
HGB BLD-MCNC: 10.8 G/DL — LOW (ref 13–17)
MCHC RBC-ENTMCNC: 26.6 PG — LOW (ref 27–34)
MCHC RBC-ENTMCNC: 30.9 G/DL — LOW (ref 32–36)
MCV RBC AUTO: 86.2 FL — SIGNIFICANT CHANGE UP (ref 80–100)
NRBC # BLD AUTO: 0 K/UL — SIGNIFICANT CHANGE UP (ref 0–0)
NRBC # FLD: 0 K/UL — SIGNIFICANT CHANGE UP (ref 0–0)
NRBC BLD AUTO-RTO: 0 /100 WBCS — SIGNIFICANT CHANGE UP (ref 0–0)
PLATELET # BLD AUTO: 436 K/UL — HIGH (ref 150–400)
PMV BLD: 9.2 FL — SIGNIFICANT CHANGE UP (ref 7–13)
RBC # BLD: 4.06 M/UL — LOW (ref 4.2–5.8)
RBC # FLD: 17.2 % — HIGH (ref 10.3–14.5)
TROPONIN T, HIGH SENSITIVITY RESULT: 121 NG/L — HIGH (ref 0–51)
WBC # BLD: 9.9 K/UL — SIGNIFICANT CHANGE UP (ref 3.8–10.5)
WBC # FLD AUTO: 9.9 K/UL — SIGNIFICANT CHANGE UP (ref 3.8–10.5)

## 2025-04-13 PROCEDURE — 74177 CT ABD & PELVIS W/CONTRAST: CPT | Mod: 26

## 2025-04-13 PROCEDURE — 93306 TTE W/DOPPLER COMPLETE: CPT | Mod: 26

## 2025-04-13 PROCEDURE — 99223 1ST HOSP IP/OBS HIGH 75: CPT

## 2025-04-13 RX ORDER — POLYETHYLENE GLYCOL 3350 17 G/17G
17 POWDER, FOR SOLUTION ORAL AT BEDTIME
Refills: 0 | Status: DISCONTINUED | OUTPATIENT
Start: 2025-04-13 | End: 2025-04-16

## 2025-04-13 RX ORDER — POLYETHYLENE GLYCOL 3350 17 G/17G
17 POWDER, FOR SOLUTION ORAL DAILY
Refills: 0 | Status: DISCONTINUED | OUTPATIENT
Start: 2025-04-13 | End: 2025-04-16

## 2025-04-13 RX ORDER — HEPARIN SODIUM 1000 [USP'U]/ML
4400 INJECTION INTRAVENOUS; SUBCUTANEOUS ONCE
Refills: 0 | Status: COMPLETED | OUTPATIENT
Start: 2025-04-13 | End: 2025-04-13

## 2025-04-13 RX ORDER — INSULIN LISPRO 100 U/ML
INJECTION, SOLUTION INTRAVENOUS; SUBCUTANEOUS EVERY 6 HOURS
Refills: 0 | Status: DISCONTINUED | OUTPATIENT
Start: 2025-04-13 | End: 2025-04-14

## 2025-04-13 RX ORDER — LEVOTHYROXINE SODIUM 300 MCG
1 TABLET ORAL
Refills: 0 | DISCHARGE

## 2025-04-13 RX ORDER — SENNA 187 MG
2 TABLET ORAL AT BEDTIME
Refills: 0 | Status: DISCONTINUED | OUTPATIENT
Start: 2025-04-13 | End: 2025-04-16

## 2025-04-13 RX ORDER — ACETAMINOPHEN 500 MG/5ML
1000 LIQUID (ML) ORAL ONCE
Refills: 0 | Status: COMPLETED | OUTPATIENT
Start: 2025-04-13 | End: 2025-04-13

## 2025-04-13 RX ORDER — INSULIN GLARGINE-YFGN 100 [IU]/ML
10 INJECTION, SOLUTION SUBCUTANEOUS AT BEDTIME
Refills: 0 | Status: DISCONTINUED | OUTPATIENT
Start: 2025-04-13 | End: 2025-04-16

## 2025-04-13 RX ORDER — ONDANSETRON HCL/PF 4 MG/2 ML
4 VIAL (ML) INJECTION EVERY 8 HOURS
Refills: 0 | Status: DISCONTINUED | OUTPATIENT
Start: 2025-04-13 | End: 2025-04-16

## 2025-04-13 RX ORDER — ACETAMINOPHEN 500 MG/5ML
650 LIQUID (ML) ORAL EVERY 6 HOURS
Refills: 0 | Status: DISCONTINUED | OUTPATIENT
Start: 2025-04-13 | End: 2025-04-16

## 2025-04-13 RX ORDER — DEXTROSE 50 % IN WATER 50 %
12.5 SYRINGE (ML) INTRAVENOUS ONCE
Refills: 0 | Status: DISCONTINUED | OUTPATIENT
Start: 2025-04-13 | End: 2025-04-16

## 2025-04-13 RX ORDER — LEVOTHYROXINE SODIUM 300 MCG
25 TABLET ORAL DAILY
Refills: 0 | Status: DISCONTINUED | OUTPATIENT
Start: 2025-04-13 | End: 2025-04-16

## 2025-04-13 RX ORDER — OLANZAPINE 10 MG/1
10 TABLET ORAL AT BEDTIME
Refills: 0 | Status: DISCONTINUED | OUTPATIENT
Start: 2025-04-13 | End: 2025-04-16

## 2025-04-13 RX ORDER — GLYCERIN
1 LIQUID (ML) MISCELLANEOUS
Refills: 0 | Status: DISCONTINUED | OUTPATIENT
Start: 2025-04-13 | End: 2025-04-16

## 2025-04-13 RX ORDER — VENLAFAXINE HYDROCHLORIDE 37.5 MG/1
1 CAPSULE, EXTENDED RELEASE ORAL
Refills: 0 | DISCHARGE

## 2025-04-13 RX ORDER — SACUBITRIL AND VALSARTAN 6; 6 MG/1; MG/1
1 PELLET ORAL
Refills: 0 | Status: DISCONTINUED | OUTPATIENT
Start: 2025-04-13 | End: 2025-04-16

## 2025-04-13 RX ORDER — MAGNESIUM, ALUMINUM HYDROXIDE 200-200 MG
30 TABLET,CHEWABLE ORAL EVERY 4 HOURS
Refills: 0 | Status: DISCONTINUED | OUTPATIENT
Start: 2025-04-13 | End: 2025-04-16

## 2025-04-13 RX ORDER — HEPARIN SODIUM 1000 [USP'U]/ML
4400 INJECTION INTRAVENOUS; SUBCUTANEOUS EVERY 6 HOURS
Refills: 0 | Status: DISCONTINUED | OUTPATIENT
Start: 2025-04-13 | End: 2025-04-15

## 2025-04-13 RX ORDER — INSULIN ASPART 100 [IU]/ML
0 INJECTION, SOLUTION INTRAVENOUS; SUBCUTANEOUS
Refills: 0 | DISCHARGE

## 2025-04-13 RX ORDER — SODIUM CHLORIDE 9 G/1000ML
1000 INJECTION, SOLUTION INTRAVENOUS
Refills: 0 | Status: DISCONTINUED | OUTPATIENT
Start: 2025-04-13 | End: 2025-04-16

## 2025-04-13 RX ORDER — GABAPENTIN 400 MG/1
300 CAPSULE ORAL THREE TIMES A DAY
Refills: 0 | Status: DISCONTINUED | OUTPATIENT
Start: 2025-04-13 | End: 2025-04-16

## 2025-04-13 RX ORDER — GLUCAGON 3 MG/1
1 POWDER NASAL ONCE
Refills: 0 | Status: DISCONTINUED | OUTPATIENT
Start: 2025-04-13 | End: 2025-04-16

## 2025-04-13 RX ORDER — ASPIRIN 325 MG
81 TABLET ORAL DAILY
Refills: 0 | Status: DISCONTINUED | OUTPATIENT
Start: 2025-04-13 | End: 2025-04-16

## 2025-04-13 RX ORDER — POLYETHYLENE GLYCOL 3350 17 G/17G
1 POWDER, FOR SOLUTION ORAL
Refills: 0 | DISCHARGE

## 2025-04-13 RX ORDER — LIDOCAINE HCL/PF 10 MG/ML
5 VIAL (ML) INJECTION ONCE
Refills: 0 | Status: COMPLETED | OUTPATIENT
Start: 2025-04-13 | End: 2025-04-14

## 2025-04-13 RX ORDER — METOPROLOL SUCCINATE 50 MG/1
25 TABLET, EXTENDED RELEASE ORAL DAILY
Refills: 0 | Status: DISCONTINUED | OUTPATIENT
Start: 2025-04-13 | End: 2025-04-16

## 2025-04-13 RX ORDER — ATORVASTATIN CALCIUM 80 MG/1
40 TABLET, FILM COATED ORAL AT BEDTIME
Refills: 0 | Status: DISCONTINUED | OUTPATIENT
Start: 2025-04-13 | End: 2025-04-16

## 2025-04-13 RX ORDER — GABAPENTIN 400 MG/1
1 CAPSULE ORAL
Refills: 0 | DISCHARGE

## 2025-04-13 RX ORDER — VENLAFAXINE HYDROCHLORIDE 37.5 MG/1
75 CAPSULE, EXTENDED RELEASE ORAL AT BEDTIME
Refills: 0 | Status: DISCONTINUED | OUTPATIENT
Start: 2025-04-13 | End: 2025-04-16

## 2025-04-13 RX ORDER — DEXTROSE 50 % IN WATER 50 %
15 SYRINGE (ML) INTRAVENOUS ONCE
Refills: 0 | Status: DISCONTINUED | OUTPATIENT
Start: 2025-04-13 | End: 2025-04-16

## 2025-04-13 RX ORDER — TAMSULOSIN HYDROCHLORIDE 0.4 MG/1
0.4 CAPSULE ORAL AT BEDTIME
Refills: 0 | Status: DISCONTINUED | OUTPATIENT
Start: 2025-04-13 | End: 2025-04-16

## 2025-04-13 RX ORDER — DEXTROSE 50 % IN WATER 50 %
25 SYRINGE (ML) INTRAVENOUS ONCE
Refills: 0 | Status: DISCONTINUED | OUTPATIENT
Start: 2025-04-13 | End: 2025-04-16

## 2025-04-13 RX ORDER — OLANZAPINE 10 MG/1
1 TABLET ORAL
Refills: 0 | DISCHARGE

## 2025-04-13 RX ORDER — LIDOCAINE HYDROCHLORIDE 20 MG/ML
1 JELLY TOPICAL ONCE
Refills: 0 | Status: DISCONTINUED | OUTPATIENT
Start: 2025-04-13 | End: 2025-04-13

## 2025-04-13 RX ORDER — NITROGLYCERIN 20 MG/G
0.4 OINTMENT TOPICAL
Refills: 0 | Status: DISCONTINUED | OUTPATIENT
Start: 2025-04-13 | End: 2025-04-16

## 2025-04-13 RX ORDER — HEPARIN SODIUM 1000 [USP'U]/ML
INJECTION INTRAVENOUS; SUBCUTANEOUS
Qty: 25000 | Refills: 0 | Status: DISCONTINUED | OUTPATIENT
Start: 2025-04-13 | End: 2025-04-14

## 2025-04-13 RX ORDER — MELATONIN 5 MG
3 TABLET ORAL AT BEDTIME
Refills: 0 | Status: DISCONTINUED | OUTPATIENT
Start: 2025-04-13 | End: 2025-04-16

## 2025-04-13 RX ORDER — SACUBITRIL AND VALSARTAN 6; 6 MG/1; MG/1
1 PELLET ORAL
Refills: 0 | DISCHARGE

## 2025-04-13 RX ADMIN — Medication 3 MILLILITER(S): at 14:05

## 2025-04-13 RX ADMIN — GABAPENTIN 300 MILLIGRAM(S): 400 CAPSULE ORAL at 22:02

## 2025-04-13 RX ADMIN — INSULIN LISPRO 4: 100 INJECTION, SOLUTION INTRAVENOUS; SUBCUTANEOUS at 23:44

## 2025-04-13 RX ADMIN — Medication 650 MILLIGRAM(S): at 16:37

## 2025-04-13 RX ADMIN — Medication 3 MILLILITER(S): at 22:47

## 2025-04-13 RX ADMIN — HEPARIN SODIUM 4400 UNIT(S): 1000 INJECTION INTRAVENOUS; SUBCUTANEOUS at 19:43

## 2025-04-13 RX ADMIN — Medication 650 MILLIGRAM(S): at 23:06

## 2025-04-13 RX ADMIN — HEPARIN SODIUM 1300 UNIT(S)/HR: 1000 INJECTION INTRAVENOUS; SUBCUTANEOUS at 19:38

## 2025-04-13 RX ADMIN — HEPARIN SODIUM 4400 UNIT(S): 1000 INJECTION INTRAVENOUS; SUBCUTANEOUS at 12:46

## 2025-04-13 RX ADMIN — HEPARIN SODIUM 1100 UNIT(S)/HR: 1000 INJECTION INTRAVENOUS; SUBCUTANEOUS at 12:46

## 2025-04-13 RX ADMIN — INSULIN GLARGINE-YFGN 10 UNIT(S): 100 INJECTION, SOLUTION SUBCUTANEOUS at 22:04

## 2025-04-13 RX ADMIN — GABAPENTIN 300 MILLIGRAM(S): 400 CAPSULE ORAL at 14:05

## 2025-04-13 RX ADMIN — OLANZAPINE 10 MILLIGRAM(S): 10 TABLET ORAL at 22:04

## 2025-04-13 RX ADMIN — VENLAFAXINE HYDROCHLORIDE 75 MILLIGRAM(S): 37.5 CAPSULE, EXTENDED RELEASE ORAL at 22:02

## 2025-04-13 RX ADMIN — Medication 650 MILLIGRAM(S): at 15:37

## 2025-04-13 RX ADMIN — METOPROLOL SUCCINATE 25 MILLIGRAM(S): 50 TABLET, EXTENDED RELEASE ORAL at 06:44

## 2025-04-13 RX ADMIN — Medication 324 MILLIGRAM(S): at 01:03

## 2025-04-13 RX ADMIN — HEPARIN SODIUM 900 UNIT(S)/HR: 1000 INJECTION INTRAVENOUS; SUBCUTANEOUS at 05:17

## 2025-04-13 RX ADMIN — Medication 2 TABLET(S): at 22:02

## 2025-04-13 RX ADMIN — POLYETHYLENE GLYCOL 3350 17 GRAM(S): 17 POWDER, FOR SOLUTION ORAL at 22:03

## 2025-04-13 RX ADMIN — Medication 25 MICROGRAM(S): at 06:43

## 2025-04-13 RX ADMIN — SACUBITRIL AND VALSARTAN 1 TABLET(S): 6; 6 PELLET ORAL at 18:16

## 2025-04-13 RX ADMIN — HEPARIN SODIUM 4400 UNIT(S): 1000 INJECTION INTRAVENOUS; SUBCUTANEOUS at 05:12

## 2025-04-13 RX ADMIN — POLYETHYLENE GLYCOL 3350 17 GRAM(S): 17 POWDER, FOR SOLUTION ORAL at 11:54

## 2025-04-13 RX ADMIN — GABAPENTIN 300 MILLIGRAM(S): 400 CAPSULE ORAL at 06:44

## 2025-04-13 RX ADMIN — TAMSULOSIN HYDROCHLORIDE 0.4 MILLIGRAM(S): 0.4 CAPSULE ORAL at 22:02

## 2025-04-13 RX ADMIN — SACUBITRIL AND VALSARTAN 1 TABLET(S): 6; 6 PELLET ORAL at 06:44

## 2025-04-13 RX ADMIN — Medication 81 MILLIGRAM(S): at 11:53

## 2025-04-13 RX ADMIN — Medication 650 MILLIGRAM(S): at 22:06

## 2025-04-13 RX ADMIN — ATORVASTATIN CALCIUM 40 MILLIGRAM(S): 80 TABLET, FILM COATED ORAL at 22:02

## 2025-04-13 NOTE — CONSULT NOTE ADULT - SUBJECTIVE AND OBJECTIVE BOX
[facility] - [facility], THE [facility]                                   [address] [place] [address]                                                      PHONE: [phone]                                                         FAX: [phone]  [email or web address]  ---------------------------------------------------------------------------------------------------------------------------------    Reason for Consult: NSETMI  past [place]  HPI:  MARKUS GARAY is an 64y Male PMHx PAD sp R BKA, CAD sp PCI 3 stents about 3 years at [place], DM, CHF, aw mid sternal chest pain and elevated trops.     PAST MEDICAL & SURGICAL HISTORY:  Diabetes mellitus      HTN (hypertension)      Schizoaffective disorder      PVD (peripheral vascular disease)      OA (osteoarthritis)      S/P BKA (below knee amputation), right      CAD (coronary artery disease)      Hypothyroid          No Known Allergies      MEDICATIONS  (STANDING):  aspirin enteric coated 81 milliGRAM(s) Oral daily  atorvastatin 40 milliGRAM(s) Oral at bedtime  dextrose 5%. 1000 milliLiter(s) (50 mL/Hr) IV Continuous <Continuous>  dextrose 5%. 1000 milliLiter(s) (100 mL/Hr) IV Continuous <Continuous>  dextrose 50% Injectable 25 Gram(s) IV Push once  dextrose 50% Injectable 12.5 Gram(s) IV Push once  dextrose 50% Injectable 25 Gram(s) IV Push once  gabapentin 300 milliGRAM(s) Oral three times a day  glucagon  Injectable 1 milliGRAM(s) IntraMuscular once  heparin  Infusion.  Unit(s)/Hr (9 mL/Hr) IV Continuous <Continuous>  insulin glargine Injectable (LANTUS) 10 Unit(s) SubCutaneous at bedtime  insulin lispro (ADMELOG) corrective regimen sliding scale   SubCutaneous every 6 hours  levothyroxine 25 MICROGram(s) Oral daily  metoprolol succinate ER 25 milliGRAM(s) Oral daily  OLANZapine 10 milliGRAM(s) Oral at bedtime  polyethylene glycol 3350 17 Gram(s) Oral at bedtime  polyethylene glycol 3350 17 Gram(s) Oral daily  sacubitril 24 mG/valsartan 26 mG 1 Tablet(s) Oral two times a day  senna 2 Tablet(s) Oral at bedtime  sodium chloride 0.9% lock flush 3 milliLiter(s) IV Push every 8 hours  tamsulosin 0.4 milliGRAM(s) Oral at bedtime  venlafaxine XR. 75 milliGRAM(s) Oral at bedtime    MEDICATIONS  (PRN):  acetaminophen     Tablet .. 650 milliGRAM(s) Oral every 6 hours PRN Temp greater or equal to 38C (100.4F), Mild Pain (1 - 3)  aluminum hydroxide/magnesium hydroxide/simethicone Suspension 30 milliLiter(s) Oral every 4 hours PRN Dyspepsia  dextrose Oral Gel 15 Gram(s) Oral once PRN Blood Glucose LESS THAN 70 milliGRAM(s)/deciliter  glycerin Suppository - Adult 1 Suppository(s) Rectal two times a day PRN Constipation  heparin   Injectable 4400 Unit(s) IV Push every 6 hours PRN For aPTT less than 40  melatonin 3 milliGRAM(s) Oral at bedtime PRN Insomnia  nitroglycerin     SubLingual 0.4 milliGRAM(s) SubLingual every 5 minutes PRN Chest Pain  ondansetron Injectable 4 milliGRAM(s) IV Push every 8 hours PRN Nausea and/or Vomiting      Social History:  Cigarettes:     no               Alchohol:     no            Illicit Drug Abuse:  no  FHx no SCD  ROS: Negative other than as mentioned in HPI.    Vital Signs Last 24 Hrs  T(C): 36.4 (13 Apr 2025 08:10), Max: 37 (12 Apr 2025 18:18)  T(F): 97.5 (13 Apr 2025 08:10), Max: 98.6 (12 Apr 2025 18:18)  HR: 74 (13 Apr 2025 08:10) (74 - 82)  BP: 155/89 (13 Apr 2025 08:10) (126/76 - 155/89)  BP(mean): 111 (13 Apr 2025 08:10) (111 - 111)  RR: 18 (13 Apr 2025 08:10) (18 - 23)  SpO2: 99% (13 Apr 2025 08:10) (95% - 99%)    Parameters below as of 13 Apr 2025 08:10  Patient On (Oxygen Delivery Method): room air      ICU Vital Signs Last 24 Hrs  MARKUS GARAY  I&O's Detail    I&O's Summary    Drug Dosing Weight  MARKUS GARAY      PHYSICAL EXAM:  General:  alert and cooperative.  HEENT: Head; normocephalic, atraumatic.  Eyes: Pupils reactive, cornea wnl.  Neck: Supple, no nodes adenopathy, no NVD or carotid bruit or thyromegaly.  CARDIOVASCULAR: Normal S1 and S2, No murmur, rub, gallop or lift.   LUNGS: No rales, rhonchi or wheeze. Normal breath sounds bilaterally.  ABDOMEN: Soft, nontender without mass or organomegaly. bowel sounds normoactive.  EXTREMITIES: No clubbing, cyanosis or edema. Distal pulses wnl. Right BKA  SKIN: warm and dry with normal turgor.  NEURO: Alert/oriented x 3/normal motor exam. No pathologic reflexes.    PSYCH: normal affect.        LABS:                        9.9    7.79  )-----------( 445      ( 12 Apr 2025 21:16 )             31.6     04-12    137  |  102  |  11.5  ----------------------------<  96  3.9   |  23.0  |  0.77    Ca    8.3[L]      12 Apr 2025 21:16  Mg     1.8     04-12    TPro  5.5[L]  /  Alb  2.5[L]  /  TBili  0.4  /  DBili  x   /  AST  11  /  ALT  7   /  AlkPhos  94  04-12    MARKUS GARAY      PT/INR - ( 12 Apr 2025 21:16 )   PT: 12.1 sec;   INR: 1.04 ratio         PTT - ( 12 Apr 2025 21:16 )  PTT:34.6 sec  Urinalysis Basic - ( 12 Apr 2025 21:16 )    Color: x / Appearance: x / SG: x / pH: x  Gluc: 96 mg/dL / Ketone: x  / Bili: x / Urobili: x   Blood: x / Protein: x / Nitrite: x   Leuk Esterase: x / RBC: x / WBC x   Sq Epi: x / Non Sq Epi: x / Bacteria: x        RADIOLOGY & ADDITIONAL STUDIES:    INTERPRETATION OF TELEMETRY (personally reviewed):    ECG: NS @ 82 Bifascicular block with septal Q waves no acute ischemic changes            Assessment and Plan:  In summary, MARKUS GARAY is an 64y Male with past medical history significant for PAD sp R BKA, CAD sp PCI 3 stents about 3 years at [place], DM, CHF, aw mid sternal chest pain and elevated trops.     1) NSTEMI  cw ASA, hep drip, lipitor, toprol, entresto  2) trend trops  3) Cardiac cath tomorrow  A thorough discussion  with the patient concerning all aspects of cardiac cath procedure. We reviewed the data supporting cardiac cath procedure and how it applies individually. We discussed the procedures, risks (including but not limited to death, MI, need for emergent surgery, bleeding, stroke, infection), benefits and alternatives. After all questions were answered, it was a shared decision to proceed with cardiac cath.

## 2025-04-13 NOTE — PATIENT PROFILE ADULT - SAFE PLACE TO LIVE - DETAILS
from [facility], pt states someone at [facility] made a remark when he was leaving that they would not take him back

## 2025-04-13 NOTE — H&P ADULT - HISTORY OF PRESENT ILLNESS
65 y/o male with hx of PVD s/p Right BKA at [facility] in mid March, urinary retention with [facility], CAD s/p PCI with reported 3 stents > 3 years ago, and CHF, DM-2, HLD, Schizoaffective disorder, HTN, hypothyroidism, Neuropathy, who was sent to ED from [facility] for evaluation of abdominal pain and no BM x 4 days as well as SSCP with nausea/diaphoresis while reportedly arguing with the staff at [facility] because he says he was frustrated he wasn't having his needs addressed. Denies any fever, chills, HA, focal weakness. He states he is supposed to see his Surgeon this week to have the staples/stitches removed from his right BKA. In the ED vitals stable, CP free in triage. EKG with Bifascicular block, no specific ST-T changes with no prior for comparison. Initial trop 104 then 121. Right BKA wound clean and intact. CT abd/pelvis with large stool burden and early stercoral colitis, and malpositioned griffin which was replaced by ED. Had Manual disimpaction in ED with large amount of stool removed and now with ongoing BM and relief of pain. Denies any other complaints at this time.

## 2025-04-13 NOTE — H&P ADULT - NSHPPOADEEPVENOUSTHROMB_GEN_A_CORE
GENERAL SURGERY  DAILY PROGRESS NOTE  11/20/2022    CC: abdominal pain    Subjective:  No events overnight. Tolerating diet. No N/V. Abdominal pain similar to yesterday. Asking for pain meds. Objective:  BP (!) 127/99   Pulse 99   Temp 98.1 °F (36.7 °C) (Temporal)   Resp 16   Ht 5' 11\" (1.803 m)   Wt 111 lb 8.8 oz (50.6 kg)   LMP 10/14/2022   SpO2 100%   BMI 17.47 kg/m²     General appearance: alert, cooperative, and in no acute distress. Head: normocephalic, atraumatic  Eyes: grossly normal, extraocular movements intact  Lungs: nonlabored breathing on RA  Cardiac: regular rate  Abdomen:  soft, non distended, mild epigastric tenderness w/o rebound, rigidity, guarding  Skin: No skin abnormalities  Neurologic: Alert and oriented x 3.  Grossly normal      Assessment/Plan:  45 y.o. female w/ hx of uncontrolled, insulin-dependent diabetes presenting with DKA, elevated lipase, and acute pancreatitis    - Diet: low-fat and Zenpep with meals  - Pain: pt on suboxone  - GI/nausea ppx: protonix 40mg daily, zofran IV 4mg q6h prn  - F/u o/p for elective cholecystectomy after resolution of acute pancreatitis and DKA    Electronically signed by Flip Espino , Medical Student on 11/20/2022 at 7:38 AM no

## 2025-04-13 NOTE — H&P ADULT - PSYCHIATRIC
Patient here today for nurse blood pressure check.      BP Readings from Last 1 Encounters:   04/14/21 0909 122/74     Pulse Readings from Last 1 Encounters:   04/14/21 72         Pt's home readings in PCP inbox.    Pt increased to spirontactone to 25 mg daily 3/31. Also takes lisinopril 20 mg daily and amlodipine 5 mg daily.    No labs today.    Please advise.      details… alert and oriented x3

## 2025-04-13 NOTE — H&P ADULT - TIME BILLING
EMS events, ED course, reviewing labs/imaging studies, discussing case with ED attending, examining patient, furnishing H&P, orders, doing medication reconciliation, discussing plan of care with Patient and answering all their questions.

## 2025-04-13 NOTE — H&P ADULT - MUSCULOSKELETAL COMMENTS
right BKA wound clean, dry, staples/stitches intact right bka clean, dry, staples/sutures intact, no erythema or drainage

## 2025-04-13 NOTE — H&P ADULT - NSICDXPASTSURGICALHX_GEN_ALL_CORE_FT
PAST SURGICAL HISTORY:  CAD (coronary artery disease)     Hypothyroid     S/P BKA (below knee amputation), right

## 2025-04-13 NOTE — CHART NOTE - NSCHARTNOTEFT_GEN_A_CORE
Patient admitted for NSTEMI and severe constipation. Denies any chest pain or shortness of breath at this time. Only complaining of rectal pain from disimpaction.    Consulted [facility]iology, pending recommendations.  Follow up TTE.  Started on senna along with Miralax.

## 2025-04-13 NOTE — PATIENT PROFILE ADULT - FALL HARM RISK - HARM RISK INTERVENTIONS
Communicate Risk of Fall with Harm to all staff/Reinforce activity limits and safety measures with patient and family/Tailored Fall Risk Interventions/Visual Cue: Yellow wristband and red socks/Bed in lowest position, wheels locked, appropriate side rails in place/Call bell, personal items and telephone in reach/Instruct patient to call for assistance before getting out of bed or chair/Non-slip footwear when patient is out of bed/[place] to call system/Physically safe environment - no spills, clutter or unnecessary equipment/Purposeful Proactive Rounding/Room/bathroom lighting operational, light cord in reach Assistance with ambulation/Assistance OOB with selected safe patient handling equipment/Communicate Risk of Fall with Harm to all staff/Discuss with provider need for PT consult/Monitor gait and stability/Provide patient with walking aids - walker, cane, crutches/Reinforce activity limits and safety measures with patient and family/Tailored Fall Risk Interventions/Visual Cue: Yellow wristband and red socks/Bed in lowest position, wheels locked, appropriate side rails in place/Call bell, personal items and telephone in reach/Instruct patient to call for assistance before getting out of bed or chair/Non-slip footwear when patient is out of bed/[place] to call system/Physically safe environment - no spills, clutter or unnecessary equipment/Purposeful Proactive Rounding/Room/bathroom lighting operational, light cord in reach

## 2025-04-13 NOTE — H&P ADULT - ASSESSMENT
65 y/o male with NSTEMI, severe constipation, Hx. of PVD s/p BKA, CHF, HTN, HLD, Hypothyroidism, DM-2, Neuropathy, Schizoaffective disorder     NSTEMI:  -Reports hx of CAD with 3 stents > 3 years ago  -Unsure who his Cardiologist is  -Currently CP free, EKG as above  -PRN SL NTG  -Aspirin/Statin/BB  -FLP/A1c  -NPO except meds pending cardio eval for [facility]  -Attempt to obtain o/p records during daytime  -Cont. on tele  -OOB to chair  -SW for DC planning back to [facility] when stable     Severe constipation:  -Disimpacted in ED  -increase miralax to BID  -PRN suppositories   -Likely from percocet use for BKA    PVD s/p Right BKA:  -Wound clean, intact  -F/U with Dr. Roth post DC for eval/staple/suture removal  -No prosthesis yet, still in wheelchair    CHF:  -No evidence of decopensated CHF  -No prior echo on file  -check TTE  -cont. Entresto, BB    Loculated pleural effusion:  -Incidental   -CT noting small and without fever, hypoxia, pleurisy, SOB, or leucocyosis  -Never smoked  -O/P repeat imaging in 4-6 weeks     HTN:  -DASH diet when no longer NPO  -Resume o/p regimen    HLD:  -Statin     DM-2:  -Basal coverage  -SS Q 6 hours while npo    Neuropathy:  -Neurontin     Schizoaffective disorder:  -Denies SI/HI  -Cont. Zyprexa     Discussed with ED staff, Patient     Disposition: Return to SNF post inpatient ischemic w/u, Anticipated LOS 3 days

## 2025-04-13 NOTE — H&P ADULT - NSHPPHYSICALEXAM_GEN_ALL_CORE
Vital Signs Last 24 Hrs  T(C): 36.8 (12 Apr 2025 23:41), Max: 37 (12 Apr 2025 18:18)  T(F): 98.2 (12 Apr 2025 23:41), Max: 98.6 (12 Apr 2025 18:18)  HR: 80 (12 Apr 2025 23:41) (78 - 82)  BP: 148/88 (12 Apr 2025 23:41) (126/76 - 148/88)  BP(mean): --  RR: 23 (12 Apr 2025 23:41) (18 - 23)  SpO2: 98% (12 Apr 2025 23:41) (96% - 98%)    Parameters below as of 12 Apr 2025 23:41  Patient On (Oxygen Delivery Method): room air

## 2025-04-14 ENCOUNTER — TRANSCRIPTION ENCOUNTER (OUTPATIENT)
Age: 65
End: 2025-04-14

## 2025-04-14 LAB
A1C WITH ESTIMATED AVERAGE GLUCOSE RESULT: 6.9 % — HIGH (ref 4–5.6)
ANION GAP SERPL CALC-SCNC: 11 MMOL/L — SIGNIFICANT CHANGE UP (ref 5–17)
APTT BLD: 43.5 SEC — HIGH (ref 24.5–35.6)
APTT BLD: 43.6 SEC — HIGH (ref 24.5–35.6)
BUN SERPL-MCNC: 11.1 MG/DL — SIGNIFICANT CHANGE UP (ref 8–20)
CALCIUM SERPL-MCNC: 8.5 MG/DL — SIGNIFICANT CHANGE UP (ref 8.4–10.5)
CHLORIDE SERPL-SCNC: 102 MMOL/L — SIGNIFICANT CHANGE UP (ref 96–108)
CHOLEST SERPL-MCNC: 148 MG/DL — SIGNIFICANT CHANGE UP
CO2 SERPL-SCNC: 26 MMOL/L — SIGNIFICANT CHANGE UP (ref 22–29)
CREAT SERPL-MCNC: 0.84 MG/DL — SIGNIFICANT CHANGE UP (ref 0.5–1.3)
EGFR: 98 ML/MIN/1.73M2 — SIGNIFICANT CHANGE UP
EGFR: 98 ML/MIN/1.73M2 — SIGNIFICANT CHANGE UP
ESTIMATED AVERAGE GLUCOSE: 151 MG/DL — HIGH (ref 68–114)
GLUCOSE BLDC GLUCOMTR-MCNC: 134 MG/DL — HIGH (ref 70–99)
GLUCOSE BLDC GLUCOMTR-MCNC: 139 MG/DL — HIGH (ref 70–99)
GLUCOSE BLDC GLUCOMTR-MCNC: 206 MG/DL — HIGH (ref 70–99)
GLUCOSE SERPL-MCNC: 143 MG/DL — HIGH (ref 70–99)
HCT VFR BLD CALC: 32.8 % — LOW (ref 39–50)
HDLC SERPL-MCNC: 41 MG/DL — SIGNIFICANT CHANGE UP
HGB BLD-MCNC: 10.1 G/DL — LOW (ref 13–17)
LDLC SERPL-MCNC: 81 MG/DL — SIGNIFICANT CHANGE UP
LIPID PNL WITH DIRECT LDL SERPL: 81 MG/DL — SIGNIFICANT CHANGE UP
MAGNESIUM SERPL-MCNC: 1.7 MG/DL — SIGNIFICANT CHANGE UP (ref 1.6–2.6)
MCHC RBC-ENTMCNC: 26.4 PG — LOW (ref 27–34)
MCHC RBC-ENTMCNC: 30.8 G/DL — LOW (ref 32–36)
MCV RBC AUTO: 85.6 FL — SIGNIFICANT CHANGE UP (ref 80–100)
MRSA PCR RESULT.: DETECTED
NONHDLC SERPL-MCNC: 107 MG/DL — SIGNIFICANT CHANGE UP
NRBC # BLD AUTO: 0 K/UL — SIGNIFICANT CHANGE UP (ref 0–0)
NRBC # FLD: 0 K/UL — SIGNIFICANT CHANGE UP (ref 0–0)
NRBC BLD AUTO-RTO: 0 /100 WBCS — SIGNIFICANT CHANGE UP (ref 0–0)
PHOSPHATE SERPL-MCNC: 2.9 MG/DL — SIGNIFICANT CHANGE UP (ref 2.4–4.7)
PLATELET # BLD AUTO: 433 K/UL — HIGH (ref 150–400)
PMV BLD: 9.2 FL — SIGNIFICANT CHANGE UP (ref 7–13)
POTASSIUM SERPL-MCNC: 4 MMOL/L — SIGNIFICANT CHANGE UP (ref 3.5–5.3)
POTASSIUM SERPL-SCNC: 4 MMOL/L — SIGNIFICANT CHANGE UP (ref 3.5–5.3)
RBC # BLD: 3.83 M/UL — LOW (ref 4.2–5.8)
RBC # FLD: 17.3 % — HIGH (ref 10.3–14.5)
S AUREUS DNA NOSE QL NAA+PROBE: DETECTED
SODIUM SERPL-SCNC: 139 MMOL/L — SIGNIFICANT CHANGE UP (ref 135–145)
TRIGL SERPL-MCNC: 146 MG/DL — SIGNIFICANT CHANGE UP
TSH SERPL-MCNC: 8.59 UIU/ML — HIGH (ref 0.27–4.2)
WBC # BLD: 7.2 K/UL — SIGNIFICANT CHANGE UP (ref 3.8–10.5)
WBC # FLD AUTO: 7.2 K/UL — SIGNIFICANT CHANGE UP (ref 3.8–10.5)

## 2025-04-14 PROCEDURE — 93010 ELECTROCARDIOGRAM REPORT: CPT

## 2025-04-14 PROCEDURE — 99232 SBSQ HOSP IP/OBS MODERATE 35: CPT

## 2025-04-14 RX ORDER — MUPIROCIN CALCIUM 20 MG/G
1 CREAM TOPICAL
Refills: 0 | Status: DISCONTINUED | OUTPATIENT
Start: 2025-04-14 | End: 2025-04-16

## 2025-04-14 RX ORDER — LIDOCAINE HYDROCHLORIDE 20 MG/ML
1 JELLY TOPICAL ONCE
Refills: 0 | Status: DISCONTINUED | OUTPATIENT
Start: 2025-04-14 | End: 2025-04-14

## 2025-04-14 RX ORDER — SPIRONOLACTONE 25 MG
25 TABLET ORAL DAILY
Refills: 0 | Status: DISCONTINUED | OUTPATIENT
Start: 2025-04-14 | End: 2025-04-16

## 2025-04-14 RX ORDER — LIDOCAINE HCL/PF 10 MG/ML
5 VIAL (ML) INJECTION ONCE
Refills: 0 | Status: COMPLETED | OUTPATIENT
Start: 2025-04-14 | End: 2025-04-14

## 2025-04-14 RX ORDER — MAGNESIUM SULFATE 500 MG/ML
2 SYRINGE (ML) INJECTION ONCE
Refills: 0 | Status: COMPLETED | OUTPATIENT
Start: 2025-04-14 | End: 2025-04-14

## 2025-04-14 RX ORDER — INSULIN LISPRO 100 U/ML
INJECTION, SOLUTION INTRAVENOUS; SUBCUTANEOUS
Refills: 0 | Status: DISCONTINUED | OUTPATIENT
Start: 2025-04-14 | End: 2025-04-16

## 2025-04-14 RX ADMIN — Medication 30 MILLILITER(S): at 10:10

## 2025-04-14 RX ADMIN — SACUBITRIL AND VALSARTAN 1 TABLET(S): 6; 6 PELLET ORAL at 04:32

## 2025-04-14 RX ADMIN — Medication 83.33 MILLILITER(S): at 15:53

## 2025-04-14 RX ADMIN — Medication 25 GRAM(S): at 14:16

## 2025-04-14 RX ADMIN — Medication 81 MILLIGRAM(S): at 05:54

## 2025-04-14 RX ADMIN — SACUBITRIL AND VALSARTAN 1 TABLET(S): 6; 6 PELLET ORAL at 19:21

## 2025-04-14 RX ADMIN — INSULIN GLARGINE-YFGN 10 UNIT(S): 100 INJECTION, SOLUTION SUBCUTANEOUS at 21:27

## 2025-04-14 RX ADMIN — HEPARIN SODIUM 1450 UNIT(S)/HR: 1000 INJECTION INTRAVENOUS; SUBCUTANEOUS at 02:26

## 2025-04-14 RX ADMIN — Medication 1 APPLICATION(S): at 04:38

## 2025-04-14 RX ADMIN — HEPARIN SODIUM 1600 UNIT(S)/HR: 1000 INJECTION INTRAVENOUS; SUBCUTANEOUS at 08:56

## 2025-04-14 RX ADMIN — Medication 5 MILLILITER(S): at 05:52

## 2025-04-14 RX ADMIN — ATORVASTATIN CALCIUM 40 MILLIGRAM(S): 80 TABLET, FILM COATED ORAL at 21:27

## 2025-04-14 RX ADMIN — Medication 25 MICROGRAM(S): at 04:32

## 2025-04-14 RX ADMIN — MUPIROCIN CALCIUM 1 APPLICATION(S): 20 CREAM TOPICAL at 21:27

## 2025-04-14 RX ADMIN — Medication 2 TABLET(S): at 21:27

## 2025-04-14 RX ADMIN — GABAPENTIN 300 MILLIGRAM(S): 400 CAPSULE ORAL at 19:21

## 2025-04-14 RX ADMIN — Medication 5 MILLILITER(S): at 22:57

## 2025-04-14 RX ADMIN — VENLAFAXINE HYDROCHLORIDE 75 MILLIGRAM(S): 37.5 CAPSULE, EXTENDED RELEASE ORAL at 21:27

## 2025-04-14 RX ADMIN — Medication 3 MILLILITER(S): at 05:27

## 2025-04-14 RX ADMIN — HEPARIN SODIUM 1450 UNIT(S)/HR: 1000 INJECTION INTRAVENOUS; SUBCUTANEOUS at 07:19

## 2025-04-14 RX ADMIN — Medication 650 MILLIGRAM(S): at 20:18

## 2025-04-14 RX ADMIN — Medication 3 MILLILITER(S): at 21:33

## 2025-04-14 RX ADMIN — GABAPENTIN 300 MILLIGRAM(S): 400 CAPSULE ORAL at 04:32

## 2025-04-14 RX ADMIN — METOPROLOL SUCCINATE 25 MILLIGRAM(S): 50 TABLET, EXTENDED RELEASE ORAL at 04:32

## 2025-04-14 RX ADMIN — Medication 650 MILLIGRAM(S): at 12:28

## 2025-04-14 RX ADMIN — Medication 650 MILLIGRAM(S): at 21:18

## 2025-04-14 RX ADMIN — Medication 3 MILLILITER(S): at 14:23

## 2025-04-14 RX ADMIN — OLANZAPINE 10 MILLIGRAM(S): 10 TABLET ORAL at 21:26

## 2025-04-14 RX ADMIN — TAMSULOSIN HYDROCHLORIDE 0.4 MILLIGRAM(S): 0.4 CAPSULE ORAL at 21:27

## 2025-04-14 RX ADMIN — Medication 83.33 MILLILITER(S): at 19:18

## 2025-04-14 NOTE — DISCHARGE NOTE PROVIDER - HOSPITAL COURSE
63 y/o male with NSTEMI, severe constipation, Hx. of PVD s/p BKA, CHF, HTN, HLD, Hypothyroidism, DM-2, Neuropathy, Schizoaffective disorder sent to ED from [facility] for evaluation of abdominal pain and no BM x 4 days as well as SSCP with nausea/diaphoresis while reportedly arguing with the staff at [facility] because he says he was frustrated he wasn't having his needs addressed. In the ED vitals stable, CP free in triage. EKG with Bifascicular block, no specific ST-T changes with no prior for comparison. Initial trop 104 then 121. Right BKA wound clean and intact. CT abd/pelvis with large stool burden and early stercoral colitis, and malpositioned griffin which was replaced by ED. Had Manual disimpaction in ED with large amount of stool removed and now with ongoing BM and relief of pain. Denies any other complaints at this time. Cardiology was consulted and patient started on heparin drip. Patient underwent LHC which showed Mid LAD stent restenosis with severe LV dysfunction. Cardiology rec GDMT for ischemic cardiomyopathy.  (Low EF new vs chronic) Eventual PCI of LAD (may be done as out pt). Patient cleared for discharged back to [facility].

## 2025-04-14 NOTE — DISCHARGE NOTE PROVIDER - ATTENDING DISCHARGE PHYSICAL EXAMINATION:
Vital Signs Last 24 Hrs  T(C): 36.3 (15 Apr 2025 08:52), Max: 36.7 (14 Apr 2025 20:24)  T(F): 97.4 (15 Apr 2025 08:52), Max: 98.1 (14 Apr 2025 20:24)  HR: 80 (15 Apr 2025 08:52) (72 - 80)  BP: 118/86 (15 Apr 2025 08:52) (118/86 - 149/86)  BP(mean): 107 (15 Apr 2025 05:33) (93 - 107)  RR: 18 (15 Apr 2025 08:52) (15 - 18)  SpO2: 97% (15 Apr 2025 08:52) (93% - 100%)    Parameters below as of 15 Apr 2025 08:52  Patient On (Oxygen Delivery Method): room air    PHYSICAL EXAM:  Constitutional: No acute distress, alert and oriented by 3  HEENT: AT/NC, EOMI, PERRLA, Normal conjunctiva, no pharyngeal erythema, moist oral mucosa  Respiratory: CTA BL, equal breath sounds, no crackles or wheezing  Cardiovascular: RRR, no edema  Gastrointestinal: soft, Non-tender, Non-distended + Bowel sounds, no rebound or guarding  Genitourinary: + griffin  Musculoskeletal: right BKA  Neurological: CN 2-12 grossly intact, no focal deficits  Skin: warm, dry and intact except left BKA incision with staples no erythema  Psychiatric: normal mood and affect

## 2025-04-14 NOTE — CHART NOTE - NSCHARTNOTEFT_GEN_A_CORE
Now s/p LHC via  RRA with Dr. Field. Pt tolerated procedure well. Pt arrived to recovery in NAD and HDS.  Access site stable, no bleed/hematoma, distal pulse +.  Denies complaints of chest pain, SOB, dizziness, or palpitations    Intraprocedural findings preliminary report     Mid LAD stent restenosis with severe LV dysfunction.  GDMT for ischemic cardiomyopathy.  (Low EF new vs chronic)  Eventual PCI of LAD (may be done as out pt)    Stents: NO stents    Medications  Versed:  Fentanyl:  Heparin:  Plavix/Brillinta:   Omnipaque:    Closure Device: RRA band     ALLERGIES:   No Known Allergies    PHYSICAL EXAM:  Constitutional: Comfortable . No acute distress.   CNS: A&O for 3. No focal deficits.   Respiratory: CTAB, unlabored   Cardiovascular: RRR normal s1 s2. No murmur. No gallop.  Gastrointestinal: Soft, non-tender. +Bowel sounds.   Extremities: 2+ Peripheral Pulses, No  edema  Wrist: R + Benign, no hematoma, no bleeding.   Psychiatric: Calm . no agitation.   Skin: Warm and dry.    VITAL SIGNS:   T(C): 36.3 (04-14-25 @ 14:25), Max: 36.6 (04-13-25 @ 19:50)  T(F): 97.4 (04-14-25 @ 14:25), Max: 97.9 (04-13-25 @ 19:50)  HR: 72 (04-14-25 @ 14:25) (72 - 79)  BP: 138/82 (04-14-25 @ 14:25) (131/80 - 153/89)  RR: 16 (04-14-25 @ 14:25) (16 - 20)  SpO2: 97% (04-14-25 @ 14:25) (93% - 97%)    s/p Cath: pt is now s/p LHC via RRA approach with Dr. Field  See preliminary results above     Plan:  -Formal cath report pending  -Post procedure management/monitoring per protocol  -Radial compression band removal at 20.30  -Bedrest for 2 hours post procedure  -Radial precautions discussed with patient  -NS 0.9% 250ml/ in 3 hr post procedure ISIDRO ppx   -Repeat ECG if any clinical indication or change on tele  -Continue current medical therapy  aspirin enteric coated 81 milliGRAM(s) Oral daily  atorvastatin 40 milliGRAM(s) Oral at bedtime  metoprolol succinate ER 25 milliGRAM(s) Oral daily  sacubitril 24 mG/valsartan 26 mG 1 Tablet(s) Oral two times a day  spironolactone 25 milliGRAM(s) Oral daily  - HOLD METFORMIN POST CATH 2 DAYS THEN RESUME AS USUAL DOSING.   -Educated regarding post procedure management and care  -Discussed the importance of RF modification  - [place] cardiology following     Case discussed with Dr Field Now s/p LHC via  RRA with Dr. Field. Pt tolerated procedure well. Pt arrived to recovery in NAD and HDS.  Access site stable, no bleed/hematoma, distal pulse +.  Denies complaints of chest pain, SOB, dizziness, or palpitations    Intraprocedural findings preliminary report     Mid LAD stent restenosis with severe LV dysfunction.  GDMT for ischemic cardiomyopathy.  (Low EF new vs chronic)  Eventual PCI of LAD (may be done as out pt)    Stents: NO stents    Medications  Versed: 1 mg  Fentanyl: 25 mcg  Heparin: 4000  Plavix/Brillinta: no  Omnipaque: 84 ml    Closure Device: RRA band     ALLERGIES:   No Known Allergies    PHYSICAL EXAM:  Constitutional: Comfortable . No acute distress.   CNS: A&O for 3. No focal deficits.   Respiratory: CTAB, unlabored   Cardiovascular: RRR normal s1 s2. No murmur. No gallop.  Gastrointestinal: Soft, non-tender. +Bowel sounds.   Extremities: 2+ Peripheral Pulses, No  edema  Wrist: R + Benign, no hematoma, no bleeding.   Psychiatric: Calm . no agitation.   Skin: Warm and dry.    VITAL SIGNS:   T(C): 36.3 (04-14-25 @ 14:25), Max: 36.6 (04-13-25 @ 19:50)  T(F): 97.4 (04-14-25 @ 14:25), Max: 97.9 (04-13-25 @ 19:50)  HR: 72 (04-14-25 @ 14:25) (72 - 79)  BP: 138/82 (04-14-25 @ 14:25) (131/80 - 153/89)  RR: 16 (04-14-25 @ 14:25) (16 - 20)  SpO2: 97% (04-14-25 @ 14:25) (93% - 97%)    s/p Cath: pt is now s/p LHC via RRA approach with Dr. Field  See preliminary results above     Plan:  -Formal cath report pending  -Post procedure management/monitoring per protocol  -Radial compression band removal at 20.30  -Bedrest for 2 hours post procedure  -Radial precautions discussed with patient  -NS 0.9% 250ml/ in 3 hr post procedure ISIDRO ppx   -Repeat ECG if any clinical indication or change on tele  -Continue current medical therapy  aspirin enteric coated 81 milliGRAM(s) Oral daily  atorvastatin 40 milliGRAM(s) Oral at bedtime  metoprolol succinate ER 25 milliGRAM(s) Oral daily  sacubitril 24 mG/valsartan 26 mG 1 Tablet(s) Oral two times a day  spironolactone 25 milliGRAM(s) Oral daily  - HOLD METFORMIN POST CATH 2 DAYS THEN RESUME AS USUAL DOSING.   -Educated regarding post procedure management and care  -Discussed the importance of RF modification  - [facility] cardiology following     Case discussed with Dr Field

## 2025-04-14 NOTE — DISCHARGE NOTE PROVIDER - CARE PROVIDER_API CALL
Ryan Field  Cardiovascular Disease  [address] [place] [id number]  Phone: [phone]  Fax: [phone]  Follow Up Time: 2 weeks

## 2025-04-14 NOTE — DISCHARGE NOTE PROVIDER - NSDCMRMEDTOKEN_GEN_ALL_CORE_FT
Aspirin Enteric Coated 81 mg oral delayed release tablet: 1 tab(s) orally once a day  atorvastatin 40 mg oral tablet: 1 tab(s) orally once a day (at bedtime)  Entresto 24 mg-26 mg oral tablet: 1 tab(s) orally 2 times a day  Insulin Aspart: SS  Insulin Glargine: 15 unit(s) subcutaneous once a day (at bedtime)  metoprolol succinate 25 mg oral tablet, extended release: 1 tab(s) orally 2 times a day  Neurontin 300 mg oral capsule: 1 cap(s) orally 3 times a day  Percocet 5 mg-325 mg oral tablet: 1 tab(s) orally 2 times a day  Polyethylene Glycol 3350: 1 dose(s) orally once a day (at bedtime)  Synthroid 25 mcg (0.025 mg) oral tablet: 1 tab(s) orally once a day  venlafaxine 75 mg oral capsule, extended release: 1 cap(s) orally once a day  ZyPREXA 10 mg oral tablet: 1 tab(s) orally once a day (at bedtime)   Aspirin Enteric Coated 81 mg oral delayed release tablet: 1 tab(s) orally once a day  atorvastatin 40 mg oral tablet: 1 tab(s) orally once a day (at bedtime)  Colace 100 mg oral capsule: 1 cap(s) orally once a day  dapagliflozin 10 mg oral tablet: 1 tab(s) orally once a day  Entresto 24 mg-26 mg oral tablet: 1 tab(s) orally 2 times a day  Insulin Aspart: SS  insulin glargine 100 units/mL subcutaneous solution: 10 unit(s) subcutaneous once a day (at bedtime)  metoprolol succinate 25 mg oral tablet, extended release: 1 tab(s) orally once a day  Neurontin 300 mg oral capsule: 1 cap(s) orally 3 times a day  Polyethylene Glycol 3350: 1 dose(s) orally once a day (at bedtime)  senna leaf extract oral tablet: 2 tab(s) orally once a day (at bedtime)  spironolactone 25 mg oral tablet: 1 tab(s) orally once a day  Synthroid 25 mcg (0.025 mg) oral tablet: 1 tab(s) orally once a day  tamsulosin 0.4 mg oral capsule: 1 cap(s) orally once a day (at bedtime)  venlafaxine 75 mg oral capsule, extended release: 1 cap(s) orally once a day  ZyPREXA 10 mg oral tablet: 1 tab(s) orally once a day (at bedtime)

## 2025-04-14 NOTE — PROGRESS NOTE ADULT - ASSESSMENT
63 y/o male with NSTEMI, severe constipation, Hx. of PVD s/p BKA, CHF, HTN, HLD, Hypothyroidism, DM-2, Neuropathy, Schizoaffective disorder     NSTEMI:  -Reports hx of CAD with 3 stents > 3 years ago  -Unsure who his Cardiologist is  -Currently CP free, EKG as above  -PRN SL NTG  -Aspirin/Statin/BB  -FLP/A1c  -NPO except meds pending cardio eval for [facility]  -Attempt to obtain o/p records during daytime  -Cont. on tele  -OOB to chair  -SW for DC planning back to [facility] when stable     Severe constipation:  -Disimpacted in ED  -increase miralax to BID  -PRN suppositories   -Likely from percocet use for BKA    PVD s/p Right BKA:  -Wound clean, intact  -F/U with Dr. Roth post DC for eval/staple/suture removal  -No prosthesis yet, still in wheelchair    CHF:  -No evidence of decopensated CHF  -No prior echo on file  -check TTE  -cont. Entresto, BB    Loculated pleural effusion:  -Incidental   -CT noting small and without fever, hypoxia, pleurisy, SOB, or leucocyosis  -Never smoked  -O/P repeat imaging in 4-6 weeks     HTN:  -DASH diet when no longer NPO  -Resume o/p regimen    HLD:  -Statin     DM-2:  -Basal coverage  -SS Q 6 hours while npo    Neuropathy:  -Neurontin     Schizoaffective disorder:  -Denies SI/HI  -Cont. Zyprexa     Discussed with ED staff, Patient     Disposition: Return to SNF post inpatient ischemic w/u, Anticipated LOS 3 days    63 y/o male with NSTEMI, severe constipation, Hx. of PVD s/p BKA, CHF, HTN, HLD, Hypothyroidism, DM-2, Neuropathy, Schizoaffective disorder     NSTEMI:  hx of CAD with 3 stents > 3 years ago  -PRN SL NTG  -Aspirin/Statin/BB  - Entresto  - Heparin drip  -NPO for cath today  -Cont. on tele  -OOB to chair  - for DC planning back to [facility] when stable     Severe constipation:  -Disimpacted in ED  -miralax to BID  -PRN suppositories   -Likely from percocet use for BKA    PVD s/p Right BKA:  -Wound clean, intact  -F/U with Dr. Roth post DC for eval/staple/suture removal  -No prosthesis yet, still in wheelchair    CHF:  -No evidence of decopensated CHF  -No prior echo on file  -check TTE  -cont. Entresto, BB    Loculated pleural effusion:  -Incidental   -CT noting small and without fever, hypoxia, pleurisy, SOB, or leucocyosis  -Never smoked  -O/P repeat imaging in 4-6 weeks     HTN:  -DASH diet when no longer NPO  -Resume o/p regimen    HLD:  -Statin     DM-2:  a1c 6.9%  -Basal coverage lantus 10units  -ISS    Neuropathy:  -Neurontin     Schizoaffective disorder:  -Denies SI/HI  -Cont. Zyprexa     Elevated TSH  - check free t4 in am    MRSA nares   - Bactroban       Disposition: Return to SNF post inpatient ischemic w/u 1-2 days

## 2025-04-14 NOTE — CONSULT NOTE ADULT - SUBJECTIVE AND OBJECTIVE BOX
[facility]                                              CARDIOLOGY AT [facility]                                                   [address], [place] [address]                                             Telephone: [phone]. Fax:[phone]                                                       CARDIOLOGY CONSULTATION NOTE                                                                                             History obtained by: Patient and medical record  Community Cardiologist: Pt doesnt recall  Reason for Consultation: [facility]    HPI: 65 y/o male with hx of PVD s/p Right BKA at [facility] in mid March, urinary retention with Griffin, CAD s/p PCI with reported 3 stents > 3 years ago, and CHF, DM-2, HLD, Schizoaffective disorder, HTN, hypothyroidism, Neuropathy, who was sent to ED from [facility] for evaluation of abdominal pain and no BM x 4 days as well as SSCP with nausea/diaphoresis while reportedly arguing with the staff at [facility] because he says he was frustrated he wasn't having his needs addressed. Denies any fever, chills, HA, focal weakness. He states he is supposed to see his Surgeon this week to have the staples/stitches removed from his right BKA. In the ED vitals stable, CP free in triage. EKG with Bifascicular block, no specific ST-T changes with no prior for comparison. Initial trop 104 then 121. Right BKA wound clean and intact. CT abd/pelvis with large stool burden and early stercoral colitis, and malpositioned griffin which was replaced by ED. Had Manual disimpaction in ED with large amount of stool removed and now with ongoing BM and relief of pain. Denies any other complaints at this time.      CARDIAC TESTING   TTE W or WO Ultrasound Enhancing Agent (04.13.25 @ 13:24) >  CONCLUSIONS:   1. Left ventricular cavity is severely dilated. Left ventricular systolic function is severely decreased with an ejection fraction visually estimated at 20 to 25 %.   2. Multiple segmental abnormalities exist. See findings.   3. There is moderate (grade 2) left ventricular diastolic dysfunction.   4. Normal right ventricular cavity size and normal right ventricular systolic function.   5. Left atrium is severely dilated.   6. The right atrium is normal in size.   7. No pericardial effusion seen.   8. Estimated pulmonary artery systolic pressure is 40 mmHg.   9. No left ventricular hypertrophy.  10. No prior echocardiogram is available for comparison.  11. There isno evidence of a left ventricular thrombus.  < end of copied text >    PAST MEDICAL HISTORY  Diabetes mellitus  HTN (hypertension)  Schizoaffective disorder  PVD (peripheral vascular disease)  OA (osteoarthritis)    PAST SURGICAL HISTORY  S/P BKA (below knee amputation), right  CAD (coronary artery disease)  Hypothyroid    SOCIAL HISTORY:  Denies smoking/alcohol/drugs**************    FAMILY HISTORY:    Family History of Cardiovascular Disease:  Yes [  ] No [  ]  Coronary Artery Disease in first degree relative: Yes [  ] No [  ]  Sudden Cardiac Death in First degree relative: Yes [  ] No [  ]    HOME MEDICATIONS:  Entresto 24 mg-26 mg oral tablet: 1 tab(s) orally 2 times a day (13 Apr 2025 04:17)  Insulin Aspart: SS (13 Apr 2025 04:17)  Insulin Glargine: 15 unit(s) subcutaneous once a day (at bedtime) (13 Apr 2025 04:17)  Neurontin 300 mg oral capsule: 1 cap(s) orally 3 times a day (13 Apr 2025 04:19)  Percocet 5 mg-325 mg oral tablet: 1 tab(s) orally 2 times a day (13 Apr 2025 04:17)  Polyethylene Glycol 3350: 1 dose(s) orally once a day (at bedtime) (13 Apr 2025 04:17)  Synthroid 25 mcg (0.025 mg) oral tablet: 1 tab(s) orally once a day (13 Apr 2025 04:17)  venlafaxine 75 mg oral capsule, extended release: 1 cap(s) orally once a day (13 Apr 2025 04:17)  ZyPREXA 10 mg oral tablet: 1 tab(s) orally once a day (at bedtime) (13 Apr 2025 04:17)    CURRENT CARDIAC MEDICATIONS:  metoprolol succinate ER 25 milliGRAM(s) Oral daily  nitroglycerin     SubLingual 0.4 milliGRAM(s) SubLingual every 5 minutes, Stop order after: 3 Doses PRN Chest Pain  sacubitril 24 mG/valsartan 26 mG 1 Tablet(s) Oral two times a day    CURRENT OTHER MEDICATIONS:  acetaminophen     Tablet .. 650 milliGRAM(s) Oral every 6 hours PRN Temp greater or equal to 38C (100.4F), Mild Pain (1 - 3)  gabapentin 300 milliGRAM(s) Oral three times a day  melatonin 3 milliGRAM(s) Oral at bedtime PRN Insomnia  OLANZapine 10 milliGRAM(s) Oral at bedtime  ondansetron Injectable 4 milliGRAM(s) IV Push every 8 hours PRN Nausea and/or Vomiting  venlafaxine XR. 75 milliGRAM(s) Oral at bedtime  aluminum hydroxide/magnesium hydroxide/simethicone Suspension 30 milliLiter(s) Oral every 4 hours PRN Dyspepsia  glycerin Suppository - Adult 1 Suppository(s) Rectal two times a day PRN Constipation  polyethylene glycol 3350 17 Gram(s) Oral at bedtime  polyethylene glycol 3350 17 Gram(s) Oral daily  senna 2 Tablet(s) Oral at bedtime  aspirin enteric coated 81 milliGRAM(s) Oral daily  atorvastatin 40 milliGRAM(s) Oral at bedtime  chlorhexidine 2% Cloths 1 Application(s) Topical <User Schedule>  dextrose 5%. 1000 milliLiter(s) (100 mL/Hr) IV Continuous <Continuous>  dextrose 5%. 1000 milliLiter(s) (50 mL/Hr) IV Continuous <Continuous>  dextrose 50% Injectable 25 Gram(s) IV Push once, Stop order after: 1 Doses  dextrose 50% Injectable 12.5 Gram(s) IV Push once, Stop order after: 1 Doses  dextrose 50% Injectable 25 Gram(s) IV Push once, Stop order after: 1 Doses  dextrose Oral Gel 15 Gram(s) Oral once, Stop order after: 1 Doses PRN Blood Glucose LESS THAN 70 milliGRAM(s)/deciliter  glucagon  Injectable 1 milliGRAM(s) IntraMuscular once, Stop order after: 1 Doses  heparin   Injectable 4400 Unit(s) IV Push every 6 hours PRN For aPTT less than 40  heparin  Infusion.  Unit(s)/Hr (9 mL/Hr) IV Continuous <Continuous>  insulin glargine Injectable (LANTUS) 10 Unit(s) SubCutaneous at bedtime  insulin lispro (ADMELOG) corrective regimen sliding scale   SubCutaneous every 6 hours  levothyroxine 25 MICROGram(s) Oral daily  mupirocin 2% Nasal 1 Application(s) Both Nostrils two times a day, Stop order after: 5 Days  sodium chloride 0.9% lock flush 3 milliLiter(s) IV Push every 8 hours  tamsulosin 0.4 milliGRAM(s) Oral at bedtime    ALLERGIES:   No Known Allergies    REVIEW OF SYMPTOMS: ******************  CONSTITUTIONAL: No fever, no chills, no weight loss, no weight gain, no fatigue   ENMT:  No vertigo; No sinus or throat pain  NECK: No pain or stiffness  CARDIOVASCULAR: No chest pain, no dyspnea, no syncope/presyncope, no palpitations, no dizziness, no Orthopnea, no Paroxsymal nocturnal dyspnea  RESPIRATORY: no Shortness of breath, no cough, no wheezing  : No dysuria, no hematuria   GI: No nausea, no diarrhea, no constipation, no abdominal pain   NEURO: No headache, no slurred speech   MUSCULOSKELETAL: No joint pain or swelling; No muscle, back, or extremity pain  PSYCH: No agitation, no anxiety.    ALL OTHER REVIEW OF SYSTEMS ARE NEGATIVE.    VITAL SIGNS:  T(C): 36.4 (04-14-25 @ 08:29), Max: 36.7 (04-13-25 @ 15:23)  T(F): 97.6 (04-14-25 @ 08:29), Max: 98 (04-13-25 @ 15:23)  HR: 77 (04-14-25 @ 08:29) (73 - 79)  BP: 148/87 (04-14-25 @ 08:29) (131/80 - 153/89)  RR: 18 (04-14-25 @ 08:29) (18 - 20)  SpO2: 96% (04-14-25 @ 08:29) (93% - 97%)    INTAKE AND OUTPUT:     04-13 @ 07:01  -  04-14 @ 07:00  --------------------------------------------------------  IN: 665 mL / OUT: 1450 mL / NET: -785 mL    PHYSICAL EXAM:  Constitutional: Comfortable . No acute distress.   HEENT: Atraumatic and normocephalic , neck is supple . no JVD.   CNS: A&Ox3. No focal deficits.   Respiratory: CTAB, unlabored ***********************  Cardiovascular: RRR normal s1 s2. No murmur. No rubs or gallop.  Gastrointestinal: Soft, non-tender. +Bowel sounds.   Extremities: 2+ Peripheral Pulses, No  edema  Psychiatric: Calm . no agitation.   Skin: Warm and dry    LABS:                10.1   7.20  )-----------( 433      ( 14 Apr 2025 06:05 )             32.8     04-14    139  |  102  |  11.1  ----------------------------<  143[H]  4.0   |  26.0  |  0.84    Ca    8.5      14 Apr 2025 06:05  Phos  2.9     04-14  Mg     1.7     04-14    TPro  5.5[L]  /  Alb  2.5[L]  /  TBili  0.4  /  DBili  x   /  AST  11  /  ALT  7   /  AlkPhos  94  04-12 04-14-25 @ 06:05  CHolesterol: 148,  HDL: 41,  LDL: 81, Triglycerides: 146     Thyroid Stimulating Hormone, Serum: 8.59 uIU/mL (04-14-25 @ 06:05)      ECG: *********************  Prior ECG: Yes [  ] No [  ]    RADIOLOGY & ADDITIONAL STUDIES:   CT Abdomen and Pelvis w/ Oral Cont and w/ IV Cont (04.13.25 @ 00:39) >  IMPRESSION:  1.  Very large stool burden throughout the colon. Rectum is filled with   hyperdense/compacted stool. Rectal wall thickening and perirectal fat   stranding, suspicious for stercoral colitis.  2.  Malpositioned Griffin catheter balloon within the prostate.  3.  Small partially loculated right pleural effusion and trace left   pleural effusion. Partial right lower lobe compressive atelectasis.  < end of copied text >                                                  [facility]                                              CARDIOLOGY AT [facility]                                                   [address], [place] [address]                                             Telephone: [phone]. Fax:[phone]                                                       CARDIOLOGY CONSULTATION NOTE                                                                                             History obtained by: Patient and medical record  Community Cardiologist: Pt doesnt recall  Reason for Consultation: [facility]    HPI: 65 y/o male with hx of PVD s/p Right BKA at [facility] in mid March, urinary retention with Griffin, CAD s/p PCI with reported 3 stents > 3 years ago, and CHF, DM-2, HLD, Schizoaffective disorder, HTN, hypothyroidism, Neuropathy, who was sent to ED from [facility] for evaluation of abdominal pain and no BM x 4 days as well as SSCP with nausea/diaphoresis while reportedly arguing with the staff at [facility] because he says he was frustrated he wasn't having his needs addressed. Denies any fever, chills, HA, focal weakness. He states he is supposed to see his Surgeon this week to have the staples/stitches removed from his right BKA. In the ED vitals stable, CP free in triage. EKG with Bifascicular block, no specific ST-T changes with no prior for comparison. Initial trop 104 then 121. Right BKA wound clean and intact. CT abd/pelvis with large stool burden and early stercoral colitis, and malpositioned griffin which was replaced by ED. Had Manual disimpaction in ED with large amount of stool removed and now with ongoing BM and relief of pain. Denies any other complaints at this time.      CARDIAC TESTING   TTE W or WO Ultrasound Enhancing Agent (04.13.25 @ 13:24) >  CONCLUSIONS:   1. Left ventricular cavity is severely dilated. Left ventricular systolic function is severely decreased with an ejection fraction visually estimated at 20 to 25 %.   2. Multiple segmental abnormalities exist. See findings.   3. There is moderate (grade 2) left ventricular diastolic dysfunction.   4. Normal right ventricular cavity size and normal right ventricular systolic function.   5. Left atrium is severely dilated.   6. The right atrium is normal in size.   7. No pericardial effusion seen.   8. Estimated pulmonary artery systolic pressure is 40 mmHg.   9. No left ventricular hypertrophy.  10. No prior echocardiogram is available for comparison.  11. There isno evidence of a left ventricular thrombus.  < end of copied text >    PAST MEDICAL HISTORY  Diabetes mellitus  HTN (hypertension)  Schizoaffective disorder  PVD (peripheral vascular disease)  OA (osteoarthritis)    PAST SURGICAL HISTORY  S/P BKA (below knee amputation), right  CAD (coronary artery disease)  Hypothyroid    SOCIAL HISTORY:  Denies smoking/alcohol/drugs    FAMILY HISTORY:  Denies   Family History of Cardiovascular Disease: No   Coronary Artery Disease in first degree relative: No   Sudden Cardiac Death in First degree relative: No     HOME MEDICATIONS:  Entresto 24 mg-26 mg oral tablet: 1 tab(s) orally 2 times a day (13 Apr 2025 04:17)  Insulin Aspart: SS (13 Apr 2025 04:17)  Insulin Glargine: 15 unit(s) subcutaneous once a day (at bedtime) (13 Apr 2025 04:17)  Neurontin 300 mg oral capsule: 1 cap(s) orally 3 times a day (13 Apr 2025 04:19)  Percocet 5 mg-325 mg oral tablet: 1 tab(s) orally 2 times a day (13 Apr 2025 04:17)  Polyethylene Glycol 3350: 1 dose(s) orally once a day (at bedtime) (13 Apr 2025 04:17)  Synthroid 25 mcg (0.025 mg) oral tablet: 1 tab(s) orally once a day (13 Apr 2025 04:17)  venlafaxine 75 mg oral capsule, extended release: 1 cap(s) orally once a day (13 Apr 2025 04:17)  ZyPREXA 10 mg oral tablet: 1 tab(s) orally once a day (at bedtime) (13 Apr 2025 04:17)    CURRENT CARDIAC MEDICATIONS:  metoprolol succinate ER 25 milliGRAM(s) Oral daily  nitroglycerin     SubLingual 0.4 milliGRAM(s) SubLingual every 5 minutes, Stop order after: 3 Doses PRN Chest Pain  sacubitril 24 mG/valsartan 26 mG 1 Tablet(s) Oral two times a day    CURRENT OTHER MEDICATIONS:  acetaminophen     Tablet .. 650 milliGRAM(s) Oral every 6 hours PRN Temp greater or equal to 38C (100.4F), Mild Pain (1 - 3)  gabapentin 300 milliGRAM(s) Oral three times a day  melatonin 3 milliGRAM(s) Oral at bedtime PRN Insomnia  OLANZapine 10 milliGRAM(s) Oral at bedtime  ondansetron Injectable 4 milliGRAM(s) IV Push every 8 hours PRN Nausea and/or Vomiting  venlafaxine XR. 75 milliGRAM(s) Oral at bedtime  aluminum hydroxide/magnesium hydroxide/simethicone Suspension 30 milliLiter(s) Oral every 4 hours PRN Dyspepsia  glycerin Suppository - Adult 1 Suppository(s) Rectal two times a day PRN Constipation  polyethylene glycol 3350 17 Gram(s) Oral at bedtime  polyethylene glycol 3350 17 Gram(s) Oral daily  senna 2 Tablet(s) Oral at bedtime  aspirin enteric coated 81 milliGRAM(s) Oral daily  atorvastatin 40 milliGRAM(s) Oral at bedtime  chlorhexidine 2% Cloths 1 Application(s) Topical <User Schedule>  dextrose 5%. 1000 milliLiter(s) (100 mL/Hr) IV Continuous <Continuous>  dextrose 5%. 1000 milliLiter(s) (50 mL/Hr) IV Continuous <Continuous>  dextrose 50% Injectable 25 Gram(s) IV Push once, Stop order after: 1 Doses  dextrose 50% Injectable 12.5 Gram(s) IV Push once, Stop order after: 1 Doses  dextrose 50% Injectable 25 Gram(s) IV Push once, Stop order after: 1 Doses  dextrose Oral Gel 15 Gram(s) Oral once, Stop order after: 1 Doses PRN Blood Glucose LESS THAN 70 milliGRAM(s)/deciliter  glucagon  Injectable 1 milliGRAM(s) IntraMuscular once, Stop order after: 1 Doses  heparin   Injectable 4400 Unit(s) IV Push every 6 hours PRN For aPTT less than 40  heparin  Infusion.  Unit(s)/Hr (9 mL/Hr) IV Continuous <Continuous>  insulin glargine Injectable (LANTUS) 10 Unit(s) SubCutaneous at bedtime  insulin lispro (ADMELOG) corrective regimen sliding scale   SubCutaneous every 6 hours  levothyroxine 25 MICROGram(s) Oral daily  mupirocin 2% Nasal 1 Application(s) Both Nostrils two times a day, Stop order after: 5 Days  sodium chloride 0.9% lock flush 3 milliLiter(s) IV Push every 8 hours  tamsulosin 0.4 milliGRAM(s) Oral at bedtime    ALLERGIES:   No Known Allergies    REVIEW OF SYMPTOMS:   CONSTITUTIONAL: No fever, no chills, no weight loss, no weight gain, no fatigue   ENMT:  No vertigo; No sinus or throat pain  NECK: No pain or stiffness  CARDIOVASCULAR: No chest pain, no dyspnea, no syncope/presyncope, no palpitations, no dizziness, no Orthopnea, no Paroxsymal nocturnal dyspnea  RESPIRATORY: no Shortness of breath, no cough, no wheezing  : No dysuria, no hematuria   GI: No nausea, no diarrhea, no constipation, + abdominal "gas"pain   NEURO: No headache, no slurred speech   MUSCULOSKELETAL: No joint pain or swelling; No muscle, back, or extremity pain  PSYCH: No agitation, no anxiety.    ALL OTHER REVIEW OF SYSTEMS ARE NEGATIVE.    VITAL SIGNS:  T(C): 36.4 (04-14-25 @ 08:29), Max: 36.7 (04-13-25 @ 15:23)  T(F): 97.6 (04-14-25 @ 08:29), Max: 98 (04-13-25 @ 15:23)  HR: 77 (04-14-25 @ 08:29) (73 - 79)  BP: 148/87 (04-14-25 @ 08:29) (131/80 - 153/89)  RR: 18 (04-14-25 @ 08:29) (18 - 20)  SpO2: 96% (04-14-25 @ 08:29) (93% - 97%)    INTAKE AND OUTPUT:     04-13 @ 07:01  -  04-14 @ 07:00  --------------------------------------------------------  IN: 665 mL / OUT: 1450 mL / NET: -785 mL    PHYSICAL EXAM:  Constitutional: Comfortable . No acute distress.   HEENT: Atraumatic and normocephalic , neck is supple . no JVD.   CNS: A&Ox3. No focal deficits.   Respiratory: CTAB, unlabored   Cardiovascular: RRR normal s1 s2. No murmur. No rubs or gallop.  Gastrointestinal: Soft, non-tender. +Bowel sounds.   Extremities: 2+ Peripheral Pulses, No  edema  Psychiatric: Calm . no agitation.   Skin: Warm and dry    LABS:                10.1   7.20  )-----------( 433      ( 14 Apr 2025 06:05 )             32.8     04-14    139  |  102  |  11.1  ----------------------------<  143[H]  4.0   |  26.0  |  0.84    Ca    8.5      14 Apr 2025 06:05  Phos  2.9     04-14  Mg     1.7     04-14    TPro  5.5[L]  /  Alb  2.5[L]  /  TBili  0.4  /  DBili  x   /  AST  11  /  ALT  7   /  AlkPhos  94  04-12 04-14-25 @ 06:05  CHolesterol: 148,  HDL: 41,  LDL: 81, Triglycerides: 146     Thyroid Stimulating Hormone, Serum: 8.59 uIU/mL (04-14-25 @ 06:05)      ECG: *********************  Prior ECG: Yes [  ] No [  ]    RADIOLOGY & ADDITIONAL STUDIES:   CT Abdomen and Pelvis w/ Oral Cont and w/ IV Cont (04.13.25 @ 00:39) >  IMPRESSION:  1.  Very large stool burden throughout the colon. Rectum is filled with   hyperdense/compacted stool. Rectal wall thickening and perirectal fat   stranding, suspicious for stercoral colitis.  2.  Malpositioned Griffin catheter balloon within the prostate.  3.  Small partially loculated right pleural effusion and trace left   pleural effusion. Partial right lower lobe compressive atelectasis.  < end of copied text >                                                  [facility]                                              CARDIOLOGY AT [facility]                                                   [address], [place] [address]                                             Telephone: [phone]. Fax:[phone]                                                       CARDIOLOGY CONSULTATION NOTE                                                                                             History obtained by: Patient and medical record  Community Cardiologist: Pt doesnt recall  Reason for Consultation: [facility]    HPI: 63 y/o male with hx of PVD s/p Right BKA at [facility] in mid March, urinary retention with Griffin, CAD s/p PCI with reported 3 stents > 3 years ago, and CHF, DM-2, HLD, Schizoaffective disorder, HTN, hypothyroidism, Neuropathy, who was sent to ED from [facility] for evaluation of abdominal pain and no BM x 4 days as well as SSCP with nausea/diaphoresis while reportedly arguing with the staff at [facility] because he says he was frustrated he wasn't having his needs addressed. Denies any fever, chills, HA, focal weakness. He states he is supposed to see his Surgeon this week to have the staples/stitches removed from his right BKA. In the ED vitals stable, CP free in triage. EKG with Bifascicular block, no specific ST-T changes with no prior for comparison. Initial trop 104 then 121. Right BKA wound clean and intact. CT abd/pelvis with large stool burden and early stercoral colitis, and malpositioned griffin which was replaced by ED. Had Manual disimpaction in ED with large amount of stool removed and now with ongoing BM and relief of pain. Denies any other complaints at this time.      CARDIAC TESTING   TTE W or WO Ultrasound Enhancing Agent (04.13.25 @ 13:24) >  CONCLUSIONS:   1. Left ventricular cavity is severely dilated. Left ventricular systolic function is severely decreased with an ejection fraction visually estimated at 20 to 25 %.   2. Multiple segmental abnormalities exist. See findings.   3. There is moderate (grade 2) left ventricular diastolic dysfunction.   4. Normal right ventricular cavity size and normal right ventricular systolic function.   5. Left atrium is severely dilated.   6. The right atrium is normal in size.   7. No pericardial effusion seen.   8. Estimated pulmonary artery systolic pressure is 40 mmHg.   9. No left ventricular hypertrophy.  10. No prior echocardiogram is available for comparison.  11. There isno evidence of a left ventricular thrombus.  < end of copied text >    PAST MEDICAL HISTORY  Diabetes mellitus  HTN (hypertension)  Schizoaffective disorder  PVD (peripheral vascular disease)  OA (osteoarthritis)    PAST SURGICAL HISTORY  S/P BKA (below knee amputation), right  CAD (coronary artery disease)  Hypothyroid    SOCIAL HISTORY:  Denies smoking/alcohol/drugs    FAMILY HISTORY:  Denies   Family History of Cardiovascular Disease: No   Coronary Artery Disease in first degree relative: No   Sudden Cardiac Death in First degree relative: No     HOME MEDICATIONS:  Entresto 24 mg-26 mg oral tablet: 1 tab(s) orally 2 times a day (13 Apr 2025 04:17)  Insulin Aspart: SS (13 Apr 2025 04:17)  Insulin Glargine: 15 unit(s) subcutaneous once a day (at bedtime) (13 Apr 2025 04:17)  Neurontin 300 mg oral capsule: 1 cap(s) orally 3 times a day (13 Apr 2025 04:19)  Percocet 5 mg-325 mg oral tablet: 1 tab(s) orally 2 times a day (13 Apr 2025 04:17)  Polyethylene Glycol 3350: 1 dose(s) orally once a day (at bedtime) (13 Apr 2025 04:17)  Synthroid 25 mcg (0.025 mg) oral tablet: 1 tab(s) orally once a day (13 Apr 2025 04:17)  venlafaxine 75 mg oral capsule, extended release: 1 cap(s) orally once a day (13 Apr 2025 04:17)  ZyPREXA 10 mg oral tablet: 1 tab(s) orally once a day (at bedtime) (13 Apr 2025 04:17)    CURRENT CARDIAC MEDICATIONS:  metoprolol succinate ER 25 milliGRAM(s) Oral daily  nitroglycerin     SubLingual 0.4 milliGRAM(s) SubLingual every 5 minutes, Stop order after: 3 Doses PRN Chest Pain  sacubitril 24 mG/valsartan 26 mG 1 Tablet(s) Oral two times a day    CURRENT OTHER MEDICATIONS:  acetaminophen     Tablet .. 650 milliGRAM(s) Oral every 6 hours PRN Temp greater or equal to 38C (100.4F), Mild Pain (1 - 3)  gabapentin 300 milliGRAM(s) Oral three times a day  melatonin 3 milliGRAM(s) Oral at bedtime PRN Insomnia  OLANZapine 10 milliGRAM(s) Oral at bedtime  ondansetron Injectable 4 milliGRAM(s) IV Push every 8 hours PRN Nausea and/or Vomiting  venlafaxine XR. 75 milliGRAM(s) Oral at bedtime  aluminum hydroxide/magnesium hydroxide/simethicone Suspension 30 milliLiter(s) Oral every 4 hours PRN Dyspepsia  glycerin Suppository - Adult 1 Suppository(s) Rectal two times a day PRN Constipation  polyethylene glycol 3350 17 Gram(s) Oral at bedtime  polyethylene glycol 3350 17 Gram(s) Oral daily  senna 2 Tablet(s) Oral at bedtime  aspirin enteric coated 81 milliGRAM(s) Oral daily  atorvastatin 40 milliGRAM(s) Oral at bedtime  chlorhexidine 2% Cloths 1 Application(s) Topical <User Schedule>  dextrose 5%. 1000 milliLiter(s) (100 mL/Hr) IV Continuous <Continuous>  dextrose 5%. 1000 milliLiter(s) (50 mL/Hr) IV Continuous <Continuous>  dextrose 50% Injectable 25 Gram(s) IV Push once, Stop order after: 1 Doses  dextrose 50% Injectable 12.5 Gram(s) IV Push once, Stop order after: 1 Doses  dextrose 50% Injectable 25 Gram(s) IV Push once, Stop order after: 1 Doses  dextrose Oral Gel 15 Gram(s) Oral once, Stop order after: 1 Doses PRN Blood Glucose LESS THAN 70 milliGRAM(s)/deciliter  glucagon  Injectable 1 milliGRAM(s) IntraMuscular once, Stop order after: 1 Doses  heparin   Injectable 4400 Unit(s) IV Push every 6 hours PRN For aPTT less than 40  heparin  Infusion.  Unit(s)/Hr (9 mL/Hr) IV Continuous <Continuous>  insulin glargine Injectable (LANTUS) 10 Unit(s) SubCutaneous at bedtime  insulin lispro (ADMELOG) corrective regimen sliding scale   SubCutaneous every 6 hours  levothyroxine 25 MICROGram(s) Oral daily  mupirocin 2% Nasal 1 Application(s) Both Nostrils two times a day, Stop order after: 5 Days  sodium chloride 0.9% lock flush 3 milliLiter(s) IV Push every 8 hours  tamsulosin 0.4 milliGRAM(s) Oral at bedtime    ALLERGIES:   No Known Allergies    REVIEW OF SYMPTOMS:   CONSTITUTIONAL: No fever, no chills, no weight loss, no weight gain, no fatigue   ENMT:  No vertigo; No sinus or throat pain  NECK: No pain or stiffness  CARDIOVASCULAR: No chest pain, no dyspnea, no syncope/presyncope, no palpitations, no dizziness, no Orthopnea, no Paroxsymal nocturnal dyspnea  RESPIRATORY: no Shortness of breath, no cough, no wheezing  : No dysuria, no hematuria   GI: No nausea, no diarrhea, no constipation, + abdominal "gas"pain   NEURO: No headache, no slurred speech   MUSCULOSKELETAL: No joint pain or swelling; No muscle, back, or extremity pain  PSYCH: No agitation, no anxiety.    ALL OTHER REVIEW OF SYSTEMS ARE NEGATIVE.    VITAL SIGNS:  T(C): 36.4 (04-14-25 @ 08:29), Max: 36.7 (04-13-25 @ 15:23)  T(F): 97.6 (04-14-25 @ 08:29), Max: 98 (04-13-25 @ 15:23)  HR: 77 (04-14-25 @ 08:29) (73 - 79)  BP: 148/87 (04-14-25 @ 08:29) (131/80 - 153/89)  RR: 18 (04-14-25 @ 08:29) (18 - 20)  SpO2: 96% (04-14-25 @ 08:29) (93% - 97%)    INTAKE AND OUTPUT:     04-13 @ 07:01  -  04-14 @ 07:00  --------------------------------------------------------  IN: 665 mL / OUT: 1450 mL / NET: -785 mL    PHYSICAL EXAM:  Constitutional: Comfortable . No acute distress.   HEENT: Atraumatic and normocephalic , neck is supple . no JVD.   CNS: A&Ox3. No focal deficits.   Respiratory: CTAB, unlabored   Cardiovascular: RRR normal s1 s2. No murmur. No rubs or gallop.  Gastrointestinal: Soft, non-tender. +Bowel sounds.   Extremities: 2+ Peripheral Pulses, No  edema  Psychiatric: Calm . no agitation.   Skin: Warm and dry    LABS:                10.1   7.20  )-----------( 433      ( 14 Apr 2025 06:05 )             32.8     04-14    139  |  102  |  11.1  ----------------------------<  143[H]  4.0   |  26.0  |  0.84    Ca    8.5      14 Apr 2025 06:05  Phos  2.9     04-14  Mg     1.7     04-14    TPro  5.5[L]  /  Alb  2.5[L]  /  TBili  0.4  /  DBili  x   /  AST  11  /  ALT  7   /  AlkPhos  94  04-12 04-14-25 @ 06:05  CHolesterol: 148,  HDL: 41,  LDL: 81, Triglycerides: 146     Thyroid Stimulating Hormone, Serum: 8.59 uIU/mL (04-14-25 @ 06:05)      ECG: SR. RBBB. no acute changes   Prior ECG: Yes     RADIOLOGY & ADDITIONAL STUDIES:   CT Abdomen and Pelvis w/ Oral Cont and w/ IV Cont (04.13.25 @ 00:39) >  IMPRESSION:  1.  Very large stool burden throughout the colon. Rectum is filled with   hyperdense/compacted stool. Rectal wall thickening and perirectal fat   stranding, suspicious for stercoral colitis.  2.  Malpositioned Griffin catheter balloon within the prostate.  3.  Small partially loculated right pleural effusion and trace left   pleural effusion. Partial right lower lobe compressive atelectasis.  < end of copied text >

## 2025-04-14 NOTE — DISCHARGE NOTE PROVIDER - NSDCCPCAREPLAN_GEN_ALL_CORE_FT
PRINCIPAL DISCHARGE DIAGNOSIS  Diagnosis: NSTEMI (non-ST elevation myocardial infarction)  Assessment and Plan of Treatment: you underwent [facility]   you will need to take your cardiac meds and follow up with Cardiology in 1-2 week for furtehr evaluation. You will need anotherbncath after a few months of medical therapy      SECONDARY DISCHARGE DIAGNOSES  Diagnosis: Constipation  Assessment and Plan of Treatment: take senna, colace and miralax  avoid narcotics  use enemas as needed

## 2025-04-14 NOTE — CONSULT NOTE ADULT - ASSESSMENT
65 y/o male with hx of PVD s/p Right BKA at [facility] in mid March, urinary retention with Griffin, CAD s/p PCI with reported 3 stents > 3 years ago, and CHF, DM-2, HLD, Schizoaffective disorder, HTN, hypothyroidism, Neuropathy, who was sent to ED from [facility] for evaluation of abdominal pain and no BM x 4 days as well as SSCP with nausea/diaphoresis while reportedly arguing with the staff at [facility] because he says he was frustrated he wasn't having his needs addressed. Denies any fever, chills, HA, focal weakness. He states he is supposed to see his Surgeon this week to have the staples/stitches removed from his right BKA. In the ED vitals stable, CP free in triage. EKG with Bifascicular block, no specific ST-T changes with no prior for comparison. Initial trop 104 then 121. Right BKA wound clean and intact. CT abd/pelvis with large stool burden and early stercoral colitis, and malpositioned griffin which was replaced by ED. Had Manual disimpaction in ED with large amount of stool removed and now with ongoing BM and relief of pain. Denies any other complaints at this time.  (13 Apr 2025 03:56)    Indication:     Risk Assessments   ASA: 3  Mallampati:  BRA:    Plan:   -[facility]   -Preferred access: RRA vs RFA  -EKG and labs reviewed  -Aspirin 81mg po pre procedure ordered   -250 0.9% NS bolus pre procedure: ISIDRO ppx ordered   -risks and benefits discussed with patient, consent obtained     Pt assessed, appropriate for sedation, pt educated regarding the plan for Versed/Fentanyl as needed.    Risks, benefits, and alternatives reviewed.  Risks including but not limited to MI, death, stroke, bleeding, infection, vessel injury, hematoma, renal failure, allergic reaction, urgent open heart surgery, restenosis and stent thrombosis were reviewed.  All questions answered.  Patient is agreeable to proceed.   65 y/o male with hx of PVD s/p Right BKA at [facility] in mid March, urinary retention with Griffin, CAD s/p PCI with reported 3 stents > 3 years ago, and CHF, DM-2, HLD, Schizoaffective disorder, HTN, hypothyroidism, Neuropathy, who was sent to ED from [facility] for evaluation of abdominal pain and no BM x 4 days as well as SSCP with nausea/diaphoresis while reportedly arguing with the staff at [facility] because he says he was frustrated he wasn't having his needs addressed. Denies any fever, chills, HA, focal weakness. He states he is supposed to see his Surgeon this week to have the staples/stitches removed from his right BKA. In the ED vitals stable, CP free in triage. EKG with Bifascicular block, no specific ST-T changes with no prior for comparison. Initial trop 104 then 121. Right BKA wound clean and intact. CT abd/pelvis with large stool burden and early stercoral colitis, and malpositioned griffin which was replaced by ED. Had Manual disimpaction in ED with large amount of stool removed and now with ongoing BM and relief of pain. Denies any other complaints at this time.  (13 Apr 2025 03:56)    Indication: NSTEMI    Risk Assessments   ASA: 3  Mallampati: 2  BRA:    Plan:   -[facility]   -Preferred access: RRA vs RFA  -EKG and labs reviewed  -Aspirin 81mg po pre procedure ordered   -250 0.9% NS total 100 cc/hr for pre procedure: ISIDRO ppx ordered   -risks and benefits discussed with patient, consent obtained     Pt assessed, appropriate for sedation, pt educated regarding the plan for Versed/Fentanyl as needed.    Risks, benefits, and alternatives reviewed.  Risks including but not limited to MI, death, stroke, bleeding, infection, vessel injury, hematoma, renal failure, allergic reaction, urgent open heart surgery, restenosis and stent thrombosis were reviewed.  All questions answered.  Patient is agreeable to proceed.   65 y/o male with hx of PVD s/p Right BKA at [facility] in mid March, urinary retention with Griffin, CAD s/p PCI with reported 3 stents > 3 years ago, and CHF, DM-2, HLD, Schizoaffective disorder, HTN, hypothyroidism, Neuropathy, who was sent to ED from [facility] for evaluation of abdominal pain and no BM x 4 days as well as SSCP with nausea/diaphoresis while reportedly arguing with the staff at [facility] because he says he was frustrated he wasn't having his needs addressed. Denies any fever, chills, HA, focal weakness. He states he is supposed to see his Surgeon this week to have the staples/stitches removed from his right BKA. In the ED vitals stable, CP free in triage. EKG with Bifascicular block, no specific ST-T changes with no prior for comparison. Initial trop 104 then 121. Right BKA wound clean and intact. CT abd/pelvis with large stool burden and early stercoral colitis, and malpositioned griffin which was replaced by ED. Had Manual disimpaction in ED with large amount of stool removed and now with ongoing BM and relief of pain. Denies any other complaints at this time.  (13 Apr 2025 03:56)    Indication: NSTEMI    Risk Assessments   ASA: 3  Mallampati: 2  BRA:    Plan:   -[facility]   -Preferred access: RRA vs RFA  -EKG and labs reviewed  -Aspirin 81mg po pre procedure ordered   -250 0.9% NS  over 3 hrs for pre procedure: ISIDRO ppx ordered   -risks and benefits discussed with patient, consent obtained     Pt assessed, appropriate for sedation, pt educated regarding the plan for Versed/Fentanyl as needed.    Risks, benefits, and alternatives reviewed.  Risks including but not limited to MI, death, stroke, bleeding, infection, vessel injury, hematoma, renal failure, allergic reaction, urgent open heart surgery, restenosis and stent thrombosis were reviewed.  All questions answered.  Patient is agreeable to proceed.

## 2025-04-15 LAB
GLUCOSE BLDC GLUCOMTR-MCNC: 103 MG/DL — HIGH (ref 70–99)
GLUCOSE BLDC GLUCOMTR-MCNC: 151 MG/DL — HIGH (ref 70–99)
GLUCOSE BLDC GLUCOMTR-MCNC: 165 MG/DL — HIGH (ref 70–99)
GLUCOSE BLDC GLUCOMTR-MCNC: 180 MG/DL — HIGH (ref 70–99)
HCT VFR BLD CALC: 33 % — LOW (ref 39–50)
HGB BLD-MCNC: 10.2 G/DL — LOW (ref 13–17)
MCHC RBC-ENTMCNC: 26.4 PG — LOW (ref 27–34)
MCHC RBC-ENTMCNC: 30.9 G/DL — LOW (ref 32–36)
MCV RBC AUTO: 85.3 FL — SIGNIFICANT CHANGE UP (ref 80–100)
NRBC # BLD AUTO: 0 K/UL — SIGNIFICANT CHANGE UP (ref 0–0)
NRBC # FLD: 0 K/UL — SIGNIFICANT CHANGE UP (ref 0–0)
NRBC BLD AUTO-RTO: 0 /100 WBCS — SIGNIFICANT CHANGE UP (ref 0–0)
PLATELET # BLD AUTO: 404 K/UL — HIGH (ref 150–400)
PMV BLD: 9.5 FL — SIGNIFICANT CHANGE UP (ref 7–13)
RBC # BLD: 3.87 M/UL — LOW (ref 4.2–5.8)
RBC # FLD: 17.2 % — HIGH (ref 10.3–14.5)
T4 FREE SERPL-MCNC: 0.8 NG/DL — LOW (ref 0.9–1.8)
WBC # BLD: 9.26 K/UL — SIGNIFICANT CHANGE UP (ref 3.8–10.5)
WBC # FLD AUTO: 9.26 K/UL — SIGNIFICANT CHANGE UP (ref 3.8–10.5)

## 2025-04-15 PROCEDURE — 99239 HOSP IP/OBS DSCHRG MGMT >30: CPT

## 2025-04-15 RX ORDER — OXYCODONE HYDROCHLORIDE AND ACETAMINOPHEN 10; 325 MG/1; MG/1
1 TABLET ORAL
Refills: 0 | DISCHARGE

## 2025-04-15 RX ORDER — DAPAGLIFLOZIN 5 MG/1
10 TABLET, FILM COATED ORAL DAILY
Refills: 0 | Status: DISCONTINUED | OUTPATIENT
Start: 2025-04-15 | End: 2025-04-16

## 2025-04-15 RX ORDER — INSULIN GLARGINE-YFGN 100 [IU]/ML
10 INJECTION, SOLUTION SUBCUTANEOUS
Qty: 0 | Refills: 0 | DISCHARGE
Start: 2025-04-15

## 2025-04-15 RX ORDER — DAPAGLIFLOZIN 5 MG/1
1 TABLET, FILM COATED ORAL
Qty: 0 | Refills: 0 | DISCHARGE
Start: 2025-04-15

## 2025-04-15 RX ORDER — MAGNESIUM HYDROXIDE 400 MG/5ML
30 SUSPENSION ORAL DAILY
Refills: 0 | Status: DISCONTINUED | OUTPATIENT
Start: 2025-04-15 | End: 2025-04-16

## 2025-04-15 RX ORDER — METOPROLOL SUCCINATE 50 MG/1
1 TABLET, EXTENDED RELEASE ORAL
Qty: 0 | Refills: 0 | DISCHARGE
Start: 2025-04-15

## 2025-04-15 RX ORDER — MINERAL OIL
133 OIL (ML) MISCELLANEOUS ONCE
Refills: 0 | Status: COMPLETED | OUTPATIENT
Start: 2025-04-15 | End: 2025-04-15

## 2025-04-15 RX ORDER — INSULIN GLARGINE-YFGN 100 [IU]/ML
15 INJECTION, SOLUTION SUBCUTANEOUS
Refills: 0 | DISCHARGE

## 2025-04-15 RX ORDER — TAMSULOSIN HYDROCHLORIDE 0.4 MG/1
1 CAPSULE ORAL
Qty: 0 | Refills: 0 | DISCHARGE
Start: 2025-04-15

## 2025-04-15 RX ORDER — SPIRONOLACTONE 25 MG
1 TABLET ORAL
Qty: 0 | Refills: 0 | DISCHARGE
Start: 2025-04-15

## 2025-04-15 RX ORDER — DOCUSATE SODIUM 100 MG
1 CAPSULE ORAL
Qty: 30 | Refills: 0
Start: 2025-04-15 | End: 2025-05-14

## 2025-04-15 RX ORDER — SENNA 187 MG
2 TABLET ORAL
Qty: 0 | Refills: 0 | DISCHARGE
Start: 2025-04-15

## 2025-04-15 RX ADMIN — SACUBITRIL AND VALSARTAN 1 TABLET(S): 6; 6 PELLET ORAL at 17:04

## 2025-04-15 RX ADMIN — Medication 3 MILLILITER(S): at 05:41

## 2025-04-15 RX ADMIN — Medication 25 MILLIGRAM(S): at 05:37

## 2025-04-15 RX ADMIN — INSULIN LISPRO 2: 100 INJECTION, SOLUTION INTRAVENOUS; SUBCUTANEOUS at 12:22

## 2025-04-15 RX ADMIN — Medication 650 MILLIGRAM(S): at 09:46

## 2025-04-15 RX ADMIN — GABAPENTIN 300 MILLIGRAM(S): 400 CAPSULE ORAL at 13:59

## 2025-04-15 RX ADMIN — DAPAGLIFLOZIN 10 MILLIGRAM(S): 5 TABLET, FILM COATED ORAL at 12:21

## 2025-04-15 RX ADMIN — Medication 1 APPLICATION(S): at 05:36

## 2025-04-15 RX ADMIN — POLYETHYLENE GLYCOL 3350 17 GRAM(S): 17 POWDER, FOR SOLUTION ORAL at 21:42

## 2025-04-15 RX ADMIN — Medication 3 MILLILITER(S): at 22:07

## 2025-04-15 RX ADMIN — MUPIROCIN CALCIUM 1 APPLICATION(S): 20 CREAM TOPICAL at 17:04

## 2025-04-15 RX ADMIN — Medication 3 MILLIGRAM(S): at 21:42

## 2025-04-15 RX ADMIN — Medication 650 MILLIGRAM(S): at 21:43

## 2025-04-15 RX ADMIN — Medication 25 MICROGRAM(S): at 05:37

## 2025-04-15 RX ADMIN — Medication 650 MILLIGRAM(S): at 10:46

## 2025-04-15 RX ADMIN — POLYETHYLENE GLYCOL 3350 17 GRAM(S): 17 POWDER, FOR SOLUTION ORAL at 12:22

## 2025-04-15 RX ADMIN — MUPIROCIN CALCIUM 1 APPLICATION(S): 20 CREAM TOPICAL at 05:40

## 2025-04-15 RX ADMIN — INSULIN GLARGINE-YFGN 10 UNIT(S): 100 INJECTION, SOLUTION SUBCUTANEOUS at 21:47

## 2025-04-15 RX ADMIN — INSULIN LISPRO 2: 100 INJECTION, SOLUTION INTRAVENOUS; SUBCUTANEOUS at 21:48

## 2025-04-15 RX ADMIN — OLANZAPINE 10 MILLIGRAM(S): 10 TABLET ORAL at 21:44

## 2025-04-15 RX ADMIN — Medication 81 MILLIGRAM(S): at 12:21

## 2025-04-15 RX ADMIN — Medication 650 MILLIGRAM(S): at 22:43

## 2025-04-15 RX ADMIN — Medication 2 TABLET(S): at 21:46

## 2025-04-15 RX ADMIN — SACUBITRIL AND VALSARTAN 1 TABLET(S): 6; 6 PELLET ORAL at 05:37

## 2025-04-15 RX ADMIN — METOPROLOL SUCCINATE 25 MILLIGRAM(S): 50 TABLET, EXTENDED RELEASE ORAL at 05:36

## 2025-04-15 RX ADMIN — INSULIN LISPRO 2: 100 INJECTION, SOLUTION INTRAVENOUS; SUBCUTANEOUS at 08:52

## 2025-04-15 RX ADMIN — VENLAFAXINE HYDROCHLORIDE 75 MILLIGRAM(S): 37.5 CAPSULE, EXTENDED RELEASE ORAL at 21:43

## 2025-04-15 RX ADMIN — GABAPENTIN 300 MILLIGRAM(S): 400 CAPSULE ORAL at 21:43

## 2025-04-15 RX ADMIN — ATORVASTATIN CALCIUM 40 MILLIGRAM(S): 80 TABLET, FILM COATED ORAL at 21:42

## 2025-04-15 RX ADMIN — GABAPENTIN 300 MILLIGRAM(S): 400 CAPSULE ORAL at 05:36

## 2025-04-15 RX ADMIN — TAMSULOSIN HYDROCHLORIDE 0.4 MILLIGRAM(S): 0.4 CAPSULE ORAL at 21:43

## 2025-04-15 RX ADMIN — Medication 3 MILLILITER(S): at 13:51

## 2025-04-15 NOTE — PHYSICAL THERAPY INITIAL EVALUATION ADULT - ASSISTIVE DEVICE FOR TRANSFER: SIT/STAND, REHAB EVAL
VS: VSS. Pt confused on occasion, disoriented to time and place   O2: RA.   Output: Condom cath changed, brief on.   Last BM: Pt unable to recall   Activity: Ax2-3 to reposition, TTWB.   Skin: Intact except for Incision.   Pain: Tylenol given   CMS: Intact.   Dressing: CDI.   Diet: Regular.   LDA: PIV left hand SL. PIV left lower forearm SL.   Plan: TCU   Additional Info:        rolling walker

## 2025-04-15 NOTE — PHYSICAL THERAPY INITIAL EVALUATION ADULT - LEVEL OF INDEPENDENCE: SIT/STAND, REHAB EVAL
x3 reps, unable to obtain full erect posture at this time, left knee remaining slightly flexed/maximum assist (25% patients effort)/unable to perform

## 2025-04-15 NOTE — PHYSICAL THERAPY INITIAL EVALUATION ADULT - PERTINENT HX OF CURRENT PROBLEM, REHAB EVAL
65 y/o male with NSTEMI, severe constipation, Hx. of PVD s/p BKA, CHF, HTN, HLD, Hypothyroidism, DM-2, Neuropathy, Schizoaffective disorder

## 2025-04-15 NOTE — PROGRESS NOTE ADULT - ASSESSMENT
65 y/o male with NSTEMI, severe constipation, Hx. of PVD s/p BKA, CHF, HTN, HLD, Hypothyroidism, DM-2, Neuropathy, Schizoaffective disorder     NSTEMI:  hx of CAD with 3 stents > 3 years ago  s/p [facility] mLAD severe stenosis outpt PCI to LAD as outpt after medical managment  - started on Farxiga   -PRN SL NTG  -Aspirin/Statin/BB  - Entresto  -Cont. on tele  -OOB to chair  - for DC planning back to [facility] when stable     Severe constipation:  -Disimpacted in ED adn had bm today  -miralax to BID  -PRN suppositories and enema  -Likely from percocet use for BKA    PVD s/p Right BKA:  -Wound clean, intact  -F/U with Dr. Roth post DC for eval/staple/suture removal  -No prosthesis yet, still in wheelchair    CHF:  -No evidence of decopensated CHF  -No prior echo on file  -cont. Entresto, BB    Loculated pleural effusion:  -Incidental   -CT noting small and without fever, hypoxia, pleurisy, SOB, or leucocytosis  -Never smoked  -O/P repeat imaging in 4-6 weeks     HTN:  -DASH diet when no longer NPO  -Resume o/p regimen    HLD:  -Statin     DM-2:  a1c 6.9%  -Basal coverage lantus 10units  -ISS    Neuropathy:  -Neurontin     Schizoaffective disorder:  -Denies SI/HI  -Cont. Zyprexa     Hypothyroisim  Elevated TSH 8, free t4 barely low at 0.8  -cont levothyroxine and recheck in 4-6 weeks    MRSA nares   - Bactroban       Disposition: Return to SNF today if bed available     63 y/o male with NSTEMI, severe constipation, Hx. of PVD s/p BKA, CHF, HTN, HLD, Hypothyroidism, DM-2, Neuropathy, Schizoaffective disorder     NSTEMI:  hx of CAD with 3 stents > 3 years ago  s/p [facility] mLAD severe stenosis outpt PCI to LAD as outpt after medical managment  - started on Farxiga   -PRN SL NTG  -Aspirin/Statin/BB  - Entresto  -Cont. on tele  -OOB to chair  - for DC planning back to [facility] when stable     Severe constipation:  -Disimpacted in ED adn had bm today  -miralax to BID  -PRN suppositories and enema  -Likely from percocet use for BKA    PVD s/p Right BKA:  -Wound clean, intact  -F/U with Dr. Roth post DC for eval/staple/suture removal  -No prosthesis yet, still in wheelchair    CHF:  -No evidence of decopensated CHF  -No prior echo on file  -cont. Entresto, BB    Loculated pleural effusion:  -Incidental   -CT noting small and without fever, hypoxia, pleurisy, SOB, or leucocytosis  -Never smoked  -O/P repeat imaging in 4-6 weeks     HTN:  -DASH diet when no longer NPO  -Resume o/p regimen    HLD:  -Statin     DM-2:  a1c 6.9%  -Basal coverage lantus 10units  -ISS    Neuropathy:  -Neurontin     Schizoaffective disorder:  -Denies SI/HI  -Cont. Zyprexa     Hypothyroisim  Elevated TSH 8, free t4 barely low at 0.8  -cont levothyroxine and recheck in 4-6 weeks    MRSA nares   - Bactroban       Disposition: Pending PT and possible Return to SNF today if bed available

## 2025-04-15 NOTE — PHYSICAL THERAPY INITIAL EVALUATION ADULT - RANGE OF MOTION EXAMINATION, REHAB EVAL
hx of right BKA/Left LE ROM was WNL (within normal limits)/Right LE ROM was WNL (within normal limits)/deficits as listed below

## 2025-04-15 NOTE — PHYSICAL THERAPY INITIAL EVALUATION ADULT - GENERAL OBSERVATIONS, REHAB EVAL
Pt received in bed + IV loc + tele + griffin, breathing on RA in NAD, in 0/10 pain, agreeable to PT evaluation

## 2025-04-15 NOTE — CHART NOTE - NSCHARTNOTEFT_GEN_A_CORE
Interventional cardiology ACP chart note       /p C via  RRA with Dr. Field on 04/14/25, .     examined pt;s cath site,   Access site stable, no bleed/hematoma, distal pulse +.  Denies complaints of chest pain, SOB, dizziness, or palpitations    Intraprocedural findings preliminary report     Mid LAD stent restenosis with severe LV dysfunction.  GDMT for ischemic cardiomyopathy.  (Low EF new vs chronic)  Eventual PCI of LAD (may be done as out pt)        CATH ACCESS SITE EXAMINED  RUE warm, perfusing, no bleeding or hematoma, distal pulses 2+, NV status inatct    RUE precautions explained to patient and he verbalized understanding    Restricted use with no heavy lifting of affected arm for 48 hours.  No submerging the arm in water for 48 hours.  You may start showering today.  Call your doctor for any bleeding, swelling, loss of sensation in the hand or fingers, or fingers turning blue.  If heavy bleeding or large lumps form, hold pressure at the spot and come to the Emergency Room.

## 2025-04-15 NOTE — PHYSICAL THERAPY INITIAL EVALUATION ADULT - ADDITIONAL COMMENTS
pt reports coming from [facility] plans to return to [facility], has been working with PT there on standing balance has no prosthetic for Right LE yet

## 2025-04-16 ENCOUNTER — TRANSCRIPTION ENCOUNTER (OUTPATIENT)
Age: 65
End: 2025-04-16

## 2025-04-16 VITALS
TEMPERATURE: 98 F | SYSTOLIC BLOOD PRESSURE: 146 MMHG | RESPIRATION RATE: 16 BRPM | HEART RATE: 84 BPM | DIASTOLIC BLOOD PRESSURE: 96 MMHG | OXYGEN SATURATION: 95 %

## 2025-04-16 LAB
GLUCOSE BLDC GLUCOMTR-MCNC: 115 MG/DL — HIGH (ref 70–99)
GLUCOSE BLDC GLUCOMTR-MCNC: 125 MG/DL — HIGH (ref 70–99)
HCT VFR BLD CALC: 35.1 % — LOW (ref 39–50)
HGB BLD-MCNC: 10.9 G/DL — LOW (ref 13–17)
MCHC RBC-ENTMCNC: 26.6 PG — LOW (ref 27–34)
MCHC RBC-ENTMCNC: 31.1 G/DL — LOW (ref 32–36)
MCV RBC AUTO: 85.6 FL — SIGNIFICANT CHANGE UP (ref 80–100)
NRBC # BLD AUTO: 0 K/UL — SIGNIFICANT CHANGE UP (ref 0–0)
NRBC # FLD: 0 K/UL — SIGNIFICANT CHANGE UP (ref 0–0)
NRBC BLD AUTO-RTO: 0 /100 WBCS — SIGNIFICANT CHANGE UP (ref 0–0)
PLATELET # BLD AUTO: 395 K/UL — SIGNIFICANT CHANGE UP (ref 150–400)
PMV BLD: 9.5 FL — SIGNIFICANT CHANGE UP (ref 7–13)
RBC # BLD: 4.1 M/UL — LOW (ref 4.2–5.8)
RBC # FLD: 17.3 % — HIGH (ref 10.3–14.5)
WBC # BLD: 7.34 K/UL — SIGNIFICANT CHANGE UP (ref 3.8–10.5)
WBC # FLD AUTO: 7.34 K/UL — SIGNIFICANT CHANGE UP (ref 3.8–10.5)

## 2025-04-16 PROCEDURE — 93458 L HRT ARTERY/VENTRICLE ANGIO: CPT

## 2025-04-16 PROCEDURE — 83735 ASSAY OF MAGNESIUM: CPT

## 2025-04-16 PROCEDURE — 93005 ELECTROCARDIOGRAM TRACING: CPT

## 2025-04-16 PROCEDURE — C1769: CPT

## 2025-04-16 PROCEDURE — 80061 LIPID PANEL: CPT

## 2025-04-16 PROCEDURE — 84100 ASSAY OF PHOSPHORUS: CPT

## 2025-04-16 PROCEDURE — C1894: CPT

## 2025-04-16 PROCEDURE — 80053 COMPREHEN METABOLIC PANEL: CPT

## 2025-04-16 PROCEDURE — 80048 BASIC METABOLIC PNL TOTAL CA: CPT

## 2025-04-16 PROCEDURE — 83880 ASSAY OF NATRIURETIC PEPTIDE: CPT

## 2025-04-16 PROCEDURE — 84439 ASSAY OF FREE THYROXINE: CPT

## 2025-04-16 PROCEDURE — 85025 COMPLETE CBC W/AUTO DIFF WBC: CPT

## 2025-04-16 PROCEDURE — C1887: CPT

## 2025-04-16 PROCEDURE — 85610 PROTHROMBIN TIME: CPT

## 2025-04-16 PROCEDURE — 99232 SBSQ HOSP IP/OBS MODERATE 35: CPT

## 2025-04-16 PROCEDURE — 84484 ASSAY OF TROPONIN QUANT: CPT

## 2025-04-16 PROCEDURE — 87641 MR-STAPH DNA AMP PROBE: CPT

## 2025-04-16 PROCEDURE — 74177 CT ABD & PELVIS W/CONTRAST: CPT | Mod: MC

## 2025-04-16 PROCEDURE — 83036 HEMOGLOBIN GLYCOSYLATED A1C: CPT

## 2025-04-16 PROCEDURE — 85730 THROMBOPLASTIN TIME PARTIAL: CPT

## 2025-04-16 PROCEDURE — 85027 COMPLETE CBC AUTOMATED: CPT

## 2025-04-16 PROCEDURE — 71045 X-RAY EXAM CHEST 1 VIEW: CPT

## 2025-04-16 PROCEDURE — 82962 GLUCOSE BLOOD TEST: CPT

## 2025-04-16 PROCEDURE — 87640 STAPH A DNA AMP PROBE: CPT

## 2025-04-16 PROCEDURE — C8929: CPT

## 2025-04-16 PROCEDURE — 36415 COLL VENOUS BLD VENIPUNCTURE: CPT

## 2025-04-16 PROCEDURE — 93010 ELECTROCARDIOGRAM REPORT: CPT

## 2025-04-16 PROCEDURE — 99285 EMERGENCY DEPT VISIT HI MDM: CPT

## 2025-04-16 PROCEDURE — 84443 ASSAY THYROID STIM HORMONE: CPT

## 2025-04-16 RX ORDER — MINERAL OIL
133 OIL (ML) MISCELLANEOUS DAILY
Refills: 0 | Status: DISCONTINUED | OUTPATIENT
Start: 2025-04-16 | End: 2025-04-16

## 2025-04-16 RX ADMIN — MAGNESIUM HYDROXIDE 30 MILLILITER(S): 400 SUSPENSION ORAL at 12:24

## 2025-04-16 RX ADMIN — SACUBITRIL AND VALSARTAN 1 TABLET(S): 6; 6 PELLET ORAL at 04:57

## 2025-04-16 RX ADMIN — Medication 650 MILLIGRAM(S): at 04:35

## 2025-04-16 RX ADMIN — DAPAGLIFLOZIN 10 MILLIGRAM(S): 5 TABLET, FILM COATED ORAL at 10:51

## 2025-04-16 RX ADMIN — Medication 30 MILLILITER(S): at 10:51

## 2025-04-16 RX ADMIN — Medication 81 MILLIGRAM(S): at 10:51

## 2025-04-16 RX ADMIN — METOPROLOL SUCCINATE 25 MILLIGRAM(S): 50 TABLET, EXTENDED RELEASE ORAL at 04:57

## 2025-04-16 RX ADMIN — Medication 25 MILLIGRAM(S): at 04:57

## 2025-04-16 RX ADMIN — MUPIROCIN CALCIUM 1 APPLICATION(S): 20 CREAM TOPICAL at 04:57

## 2025-04-16 RX ADMIN — Medication 650 MILLIGRAM(S): at 05:35

## 2025-04-16 RX ADMIN — POLYETHYLENE GLYCOL 3350 17 GRAM(S): 17 POWDER, FOR SOLUTION ORAL at 10:51

## 2025-04-16 RX ADMIN — Medication 3 MILLILITER(S): at 04:57

## 2025-04-16 RX ADMIN — Medication 1 APPLICATION(S): at 04:57

## 2025-04-16 RX ADMIN — Medication 25 MICROGRAM(S): at 04:57

## 2025-04-16 RX ADMIN — GABAPENTIN 300 MILLIGRAM(S): 400 CAPSULE ORAL at 05:11

## 2025-04-16 RX ADMIN — Medication 133 MILLILITER(S): at 12:24

## 2025-04-16 NOTE — DISCHARGE NOTE NURSING/CASE MANAGEMENT/SOCIAL WORK - NSDCFUADDAPPT_GEN_ALL_CORE_FT
[facility] - [address] [place] [id number]. [phone].

## 2025-04-16 NOTE — DISCHARGE NOTE NURSING/CASE MANAGEMENT/SOCIAL WORK - FINANCIAL ASSISTANCE
[facility] provides services at a reduced cost to those who are determined to be eligible through [facility]’s financial assistance program. Information regarding [facility]’s financial assistance program can be found by going to [email or web address] or by calling [phone].

## 2025-04-16 NOTE — PROGRESS NOTE ADULT - SUBJECTIVE AND OBJECTIVE BOX
[place] [facility] - [facility], THE HEART CENTER                                   [address] [place] [address]                                                      PHONE: [phone]                                                         FAX: [phone]  [email or web address]  ---------------------------------------------------------------------------------------------------------------------------------    Overnight events/patient complaints:      No Known Allergies    MEDICATIONS  (STANDING):  aspirin enteric coated 81 milliGRAM(s) Oral daily  atorvastatin 40 milliGRAM(s) Oral at bedtime  chlorhexidine 2% Cloths 1 Application(s) Topical <User Schedule>  chlorhexidine 4% Liquid 1 Application(s) Topical once  dextrose 5%. 1000 milliLiter(s) (50 mL/Hr) IV Continuous <Continuous>  dextrose 5%. 1000 milliLiter(s) (100 mL/Hr) IV Continuous <Continuous>  dextrose 50% Injectable 25 Gram(s) IV Push once  dextrose 50% Injectable 12.5 Gram(s) IV Push once  dextrose 50% Injectable 25 Gram(s) IV Push once  gabapentin 300 milliGRAM(s) Oral three times a day  glucagon  Injectable 1 milliGRAM(s) IntraMuscular once  insulin glargine Injectable (LANTUS) 10 Unit(s) SubCutaneous at bedtime  insulin lispro (ADMELOG) corrective regimen sliding scale   SubCutaneous Before meals and at bedtime  levothyroxine 25 MICROGram(s) Oral daily  metoprolol succinate ER 25 milliGRAM(s) Oral daily  mineral oil enema 133 milliLiter(s) Rectal once  mupirocin 2% Nasal 1 Application(s) Both Nostrils two times a day  OLANZapine 10 milliGRAM(s) Oral at bedtime  polyethylene glycol 3350 17 Gram(s) Oral at bedtime  polyethylene glycol 3350 17 Gram(s) Oral daily  sacubitril 24 mG/valsartan 26 mG 1 Tablet(s) Oral two times a day  senna 2 Tablet(s) Oral at bedtime  sodium chloride 0.9% lock flush 3 milliLiter(s) IV Push every 8 hours  spironolactone 25 milliGRAM(s) Oral daily  tamsulosin 0.4 milliGRAM(s) Oral at bedtime  venlafaxine XR. 75 milliGRAM(s) Oral at bedtime    MEDICATIONS  (PRN):  acetaminophen     Tablet .. 650 milliGRAM(s) Oral every 6 hours PRN Temp greater or equal to 38C (100.4F), Mild Pain (1 - 3)  aluminum hydroxide/magnesium hydroxide/simethicone Suspension 30 milliLiter(s) Oral every 4 hours PRN Dyspepsia  dextrose Oral Gel 15 Gram(s) Oral once PRN Blood Glucose LESS THAN 70 milliGRAM(s)/deciliter  glycerin Suppository - Adult 1 Suppository(s) Rectal two times a day PRN Constipation  magnesium hydroxide Suspension 30 milliLiter(s) Oral daily PRN Constipation  melatonin 3 milliGRAM(s) Oral at bedtime PRN Insomnia  nitroglycerin     SubLingual 0.4 milliGRAM(s) SubLingual every 5 minutes PRN Chest Pain  ondansetron Injectable 4 milliGRAM(s) IV Push every 8 hours PRN Nausea and/or Vomiting      Vital Signs Last 24 Hrs  T(C): 36.3 (15 Apr 2025 08:52), Max: 36.7 (14 Apr 2025 20:24)  T(F): 97.4 (15 Apr 2025 08:52), Max: 98.1 (14 Apr 2025 20:24)  HR: 80 (15 Apr 2025 08:52) (72 - 80)  BP: 118/86 (15 Apr 2025 08:52) (118/86 - 149/86)  BP(mean): 107 (15 Apr 2025 05:33) (93 - 107)  RR: 18 (15 Apr 2025 08:52) (15 - 18)  SpO2: 97% (15 Apr 2025 08:52) (93% - 100%)    Parameters below as of 15 Apr 2025 08:52  Patient On (Oxygen Delivery Method): room air      ICU Vital Signs Last 24 Hrs  MARKUS HUIO's Detail    14 Apr 2025 07:01  -  15 Apr 2025 07:00  --------------------------------------------------------  IN:  Total IN: 0 mL    OUT:    Indwelling Catheter - Urethral (mL): 1250 mL  Total OUT: 1250 mL    Total NET: -1250 mL        Drug Dosing Weight  MARKUS JARROD      PHYSICAL EXAM:  General:  alert and cooperative.  HEENT: Head; normocephalic, atraumatic.  Eyes: Pupils reactive, cornea wnl.  Neck: Supple, no nodes adenopathy, no NVD or carotid bruit or thyromegaly.  CARDIOVASCULAR: Normal S1 and S2, No murmur, rub, gallop or lift.   LUNGS: No rales, rhonchi or wheeze. Normal breath sounds bilaterally.  ABDOMEN: Soft, nontender without mass or organomegaly. bowel sounds normoactive.  EXTREMITIES: No clubbing, cyanosis or edema. Distal pulses wnl.   SKIN: warm and dry with normal turgor.  NEURO: Alert/oriented x 3/normal motor exam. No pathologic reflexes.    PSYCH: normal affect.        LABS:                        10.2   9.26  )-----------( 404      ( 15 Apr 2025 04:50 )             33.0     04-14    139  |  102  |  11.1  ----------------------------<  143[H]  4.0   |  26.0  |  0.84    Ca    8.5      14 Apr 2025 06:05  Phos  2.9     04-14  Mg     1.7     04-14      MARKUS GARAY      PTT - ( 14 Apr 2025 06:05 )  PTT:43.5 sec  Urinalysis Basic - ( 14 Apr 2025 06:05 )    Color: x / Appearance: x / SG: x / pH: x  Gluc: 143 mg/dL / Ketone: x  / Bili: x / Urobili: x   Blood: x / Protein: x / Nitrite: x   Leuk Esterase: x / RBC: x / WBC x   Sq Epi: x / Non Sq Epi: x / Bacteria: x        RADIOLOGY & ADDITIONAL STUDIES:    INTERPRETATION OF TELEMETRY (personally reviewed): no events         ASSESSMENT AND PLAN:  In summary, MARKUS GARAY is an 64y Male with past medical history significant for PAD sp R BKA, CAD sp PCI 3 stents about 3 years at [facility], DM, CHF, aw mid sternal chest pain and elevated trops.    1) mLAD severe stenosis  outpt PCI to LAD as outpt    2) Chronic HFrEF  -Start Farxiga 10mg daily   -cw toprol, aldactone, entresto    3) Please recall as needed
[facility], THE HEART CENTER                                   [address] [address]                                                      PHONE: [phone]                                                         FAX: [phone]  [email or web address]  ---------------------------------------------------------------------------------------------------------------------------------    Please see cath report.    Mid LAD stent restenosis with severe LV dysfunction.      GDMT for ischemic cardiomyopathy.  (Low EF new vs chronic)  EVentual PCI of LAD (may be done as out pt)        Ryan Field MD    Office [phone]  Cell     [phone]
[facility] Division of Hospital Medicine    Chief Complaint:  NSTEMI    SUBJECTIVE / OVERNIGHT EVENTS: Patient seen and examined. No chest pain and no shortness of breath. Cath planned for today.     Patient denies chest pain, SOB, abd pain, N/V, fever, chills, dysuria or any other complaints. All remainder ROS negative.     MEDICATIONS  (STANDING):  aspirin enteric coated 81 milliGRAM(s) Oral daily  atorvastatin 40 milliGRAM(s) Oral at bedtime  chlorhexidine 2% Cloths 1 Application(s) Topical <User Schedule>  chlorhexidine 4% Liquid 1 Application(s) Topical once  dextrose 5%. 1000 milliLiter(s) (100 mL/Hr) IV Continuous <Continuous>  dextrose 5%. 1000 milliLiter(s) (50 mL/Hr) IV Continuous <Continuous>  dextrose 50% Injectable 25 Gram(s) IV Push once  dextrose 50% Injectable 12.5 Gram(s) IV Push once  dextrose 50% Injectable 25 Gram(s) IV Push once  gabapentin 300 milliGRAM(s) Oral three times a day  glucagon  Injectable 1 milliGRAM(s) IntraMuscular once  heparin  Infusion.  Unit(s)/Hr (9 mL/Hr) IV Continuous <Continuous>  insulin glargine Injectable (LANTUS) 10 Unit(s) SubCutaneous at bedtime  insulin lispro (ADMELOG) corrective regimen sliding scale   SubCutaneous every 6 hours  levothyroxine 25 MICROGram(s) Oral daily  magnesium sulfate  IVPB 2 Gram(s) IV Intermittent once  metoprolol succinate ER 25 milliGRAM(s) Oral daily  mupirocin 2% Nasal 1 Application(s) Both Nostrils two times a day  OLANZapine 10 milliGRAM(s) Oral at bedtime  polyethylene glycol 3350 17 Gram(s) Oral at bedtime  polyethylene glycol 3350 17 Gram(s) Oral daily  sacubitril 24 mG/valsartan 26 mG 1 Tablet(s) Oral two times a day  senna 2 Tablet(s) Oral at bedtime  sodium chloride 0.9% lock flush 3 milliLiter(s) IV Push every 8 hours  tamsulosin 0.4 milliGRAM(s) Oral at bedtime  venlafaxine XR. 75 milliGRAM(s) Oral at bedtime    MEDICATIONS  (PRN):  acetaminophen     Tablet .. 650 milliGRAM(s) Oral every 6 hours PRN Temp greater or equal to 38C (100.4F), Mild Pain (1 - 3)  aluminum hydroxide/magnesium hydroxide/simethicone Suspension 30 milliLiter(s) Oral every 4 hours PRN Dyspepsia  dextrose Oral Gel 15 Gram(s) Oral once PRN Blood Glucose LESS THAN 70 milliGRAM(s)/deciliter  glycerin Suppository - Adult 1 Suppository(s) Rectal two times a day PRN Constipation  heparin   Injectable 4400 Unit(s) IV Push every 6 hours PRN For aPTT less than 40  melatonin 3 milliGRAM(s) Oral at bedtime PRN Insomnia  nitroglycerin     SubLingual 0.4 milliGRAM(s) SubLingual every 5 minutes PRN Chest Pain  ondansetron Injectable 4 milliGRAM(s) IV Push every 8 hours PRN Nausea and/or Vomiting        I&O's Summary    13 Apr 2025 07:01  -  14 Apr 2025 07:00  --------------------------------------------------------  IN: 665 mL / OUT: 1450 mL / NET: -785 mL        PHYSICAL EXAM:  Vital Signs Last 24 Hrs  T(C): 36.4 (14 Apr 2025 08:29), Max: 36.7 (13 Apr 2025 15:23)  T(F): 97.6 (14 Apr 2025 08:29), Max: 98 (13 Apr 2025 15:23)  HR: 77 (14 Apr 2025 08:29) (73 - 79)  BP: 148/87 (14 Apr 2025 08:29) (131/80 - 153/89)  BP(mean): 107 (14 Apr 2025 08:29) (97 - 107)  RR: 18 (14 Apr 2025 08:29) (18 - 20)  SpO2: 96% (14 Apr 2025 08:29) (93% - 97%)    Parameters below as of 14 Apr 2025 08:29  Patient On (Oxygen Delivery Method): room air            CONSTITUTIONAL: NAD  ENMT: Moist oral mucosa, no pharyngeal injection or exudates  RESPIRATORY: Normal respiratory effort; lungs are clear to auscultation bilaterally  CARDIOVASCULAR: Regular rate and rhythm, normal S1 and S2, no murmur/rub/gallop; No lower extremity edema;   ABDOMEN: Nontender to palpation, normoactive bowel sounds, no rebound/guarding  MUSCLOSKELETAL:  Normal gait; no clubbing or cyanosis of digits; no joint swelling or tenderness to palpation  PSYCH: A+O to person, place, and time; affect appropriate  NEUROLOGY: CN 2-12 are intact and symmetric; no gross sensory deficits;   SKIN: No rashes; no palpable lesions    LABS:                        10.1   7.20  )-----------( 433      ( 14 Apr 2025 06:05 )             32.8     04-14    139  |  102  |  11.1  ----------------------------<  143[H]  4.0   |  26.0  |  0.84    Ca    8.5      14 Apr 2025 06:05  Phos  2.9     04-14  Mg     1.7     04-14    TPro  5.5[L]  /  Alb  2.5[L]  /  TBili  0.4  /  DBili  x   /  AST  11  /  ALT  7   /  AlkPhos  94  04-12    PT/INR - ( 12 Apr 2025 21:16 )   PT: 12.1 sec;   INR: 1.04 ratio         PTT - ( 14 Apr 2025 06:05 )  PTT:43.5 sec      Urinalysis Basic - ( 14 Apr 2025 06:05 )    Color: x / Appearance: x / SG: x / pH: x  Gluc: 143 mg/dL / Ketone: x  / Bili: x / Urobili: x   Blood: x / Protein: x / Nitrite: x   Leuk Esterase: x / RBC: x / WBC x   Sq Epi: x / Non Sq Epi: x / Bacteria: x        CAPILLARY BLOOD GLUCOSE      POCT Blood Glucose.: 139 mg/dL (14 Apr 2025 04:35)  POCT Blood Glucose.: 209 mg/dL (13 Apr 2025 23:38)  POCT Blood Glucose.: 109 mg/dL (13 Apr 2025 17:46)        RADIOLOGY & ADDITIONAL TESTS:  Results Reviewed:   Imaging Personally Reviewed:  Electrocardiogram Personally Reviewed:                                          
[facility] Division of Hospital Medicine    Chief Complaint:  NSTEMI    SUBJECTIVE / OVERNIGHT EVENTS: Patient seen and examined. Per RN he had a large Bm this morning. Patient asking for enema because he still feels like he is not empty. He denies chest pain.    Patient denies chest pain, SOB, abd pain, N/V, fever, chills, dysuria or any other complaints. All remainder ROS negative.     MEDICATIONS  (STANDING):  aspirin enteric coated 81 milliGRAM(s) Oral daily  atorvastatin 40 milliGRAM(s) Oral at bedtime  chlorhexidine 2% Cloths 1 Application(s) Topical <User Schedule>  chlorhexidine 4% Liquid 1 Application(s) Topical once  dapagliflozin 10 milliGRAM(s) Oral daily  dextrose 5%. 1000 milliLiter(s) (50 mL/Hr) IV Continuous <Continuous>  dextrose 5%. 1000 milliLiter(s) (100 mL/Hr) IV Continuous <Continuous>  dextrose 50% Injectable 25 Gram(s) IV Push once  dextrose 50% Injectable 12.5 Gram(s) IV Push once  dextrose 50% Injectable 25 Gram(s) IV Push once  gabapentin 300 milliGRAM(s) Oral three times a day  glucagon  Injectable 1 milliGRAM(s) IntraMuscular once  insulin glargine Injectable (LANTUS) 10 Unit(s) SubCutaneous at bedtime  insulin lispro (ADMELOG) corrective regimen sliding scale   SubCutaneous Before meals and at bedtime  levothyroxine 25 MICROGram(s) Oral daily  metoprolol succinate ER 25 milliGRAM(s) Oral daily  mineral oil enema 133 milliLiter(s) Rectal once  mupirocin 2% Nasal 1 Application(s) Both Nostrils two times a day  OLANZapine 10 milliGRAM(s) Oral at bedtime  polyethylene glycol 3350 17 Gram(s) Oral at bedtime  polyethylene glycol 3350 17 Gram(s) Oral daily  sacubitril 24 mG/valsartan 26 mG 1 Tablet(s) Oral two times a day  senna 2 Tablet(s) Oral at bedtime  sodium chloride 0.9% lock flush 3 milliLiter(s) IV Push every 8 hours  spironolactone 25 milliGRAM(s) Oral daily  tamsulosin 0.4 milliGRAM(s) Oral at bedtime  venlafaxine XR. 75 milliGRAM(s) Oral at bedtime    MEDICATIONS  (PRN):  acetaminophen     Tablet .. 650 milliGRAM(s) Oral every 6 hours PRN Temp greater or equal to 38C (100.4F), Mild Pain (1 - 3)  aluminum hydroxide/magnesium hydroxide/simethicone Suspension 30 milliLiter(s) Oral every 4 hours PRN Dyspepsia  dextrose Oral Gel 15 Gram(s) Oral once PRN Blood Glucose LESS THAN 70 milliGRAM(s)/deciliter  glycerin Suppository - Adult 1 Suppository(s) Rectal two times a day PRN Constipation  magnesium hydroxide Suspension 30 milliLiter(s) Oral daily PRN Constipation  melatonin 3 milliGRAM(s) Oral at bedtime PRN Insomnia  nitroglycerin     SubLingual 0.4 milliGRAM(s) SubLingual every 5 minutes PRN Chest Pain  ondansetron Injectable 4 milliGRAM(s) IV Push every 8 hours PRN Nausea and/or Vomiting        I&O's Summary    14 Apr 2025 07:01  -  15 Apr 2025 07:00  --------------------------------------------------------  IN: 0 mL / OUT: 1250 mL / NET: -1250 mL        PHYSICAL EXAM:  Vital Signs Last 24 Hrs  T(C): 36.3 (15 Apr 2025 08:52), Max: 36.7 (14 Apr 2025 20:24)  T(F): 97.4 (15 Apr 2025 08:52), Max: 98.1 (14 Apr 2025 20:24)  HR: 80 (15 Apr 2025 08:52) (72 - 80)  BP: 118/86 (15 Apr 2025 08:52) (118/86 - 149/86)  BP(mean): 107 (15 Apr 2025 05:33) (93 - 107)  RR: 18 (15 Apr 2025 08:52) (15 - 18)  SpO2: 97% (15 Apr 2025 08:52) (93% - 100%)    Parameters below as of 15 Apr 2025 08:52  Patient On (Oxygen Delivery Method): room air      CONSTITUTIONAL: NAD  ENMT: Moist oral mucosa, no pharyngeal injection or exudates  RESPIRATORY: Normal respiratory effort; lungs are clear to auscultation bilaterally  CARDIOVASCULAR: Regular rate and rhythm, normal S1 and S2, no murmur/rub/gallop;   ABDOMEN: Nontender to palpation, normoactive bowel sounds, no rebound/guarding  MUSCLOSKELETAL: right BKA  PSYCH: A+O to person, place, and time; affect appropriate  NEUROLOGY: CN 2-12 are intact and symmetric;  SKIN: left leg BKA, incision with staples n no erythema    LABS:                        10.2   9.26  )-----------( 404      ( 15 Apr 2025 04:50 )             33.0     04-14    139  |  102  |  11.1  ----------------------------<  143[H]  4.0   |  26.0  |  0.84    Ca    8.5      14 Apr 2025 06:05  Phos  2.9     04-14  Mg     1.7     04-14      PTT - ( 14 Apr 2025 06:05 )  PTT:43.5 sec      Urinalysis Basic - ( 14 Apr 2025 06:05 )    Color: x / Appearance: x / SG: x / pH: x  Gluc: 143 mg/dL / Ketone: x  / Bili: x / Urobili: x   Blood: x / Protein: x / Nitrite: x   Leuk Esterase: x / RBC: x / WBC x   Sq Epi: x / Non Sq Epi: x / Bacteria: x        CAPILLARY BLOOD GLUCOSE      POCT Blood Glucose.: 165 mg/dL (15 Apr 2025 12:20)  POCT Blood Glucose.: 180 mg/dL (15 Apr 2025 08:43)  POCT Blood Glucose.: 206 mg/dL (14 Apr 2025 21:25)  POCT Blood Glucose.: 134 mg/dL (14 Apr 2025 14:19)        RADIOLOGY & ADDITIONAL TESTS:  Results Reviewed:   Imaging Personally Reviewed:  Electrocardiogram Personally Reviewed:                                                
seen for cad    complaining of gas stomach pains  no chest pain  awaiting andre      MEDICATIONS  (STANDING):  aspirin enteric coated 81 milliGRAM(s) Oral daily  atorvastatin 40 milliGRAM(s) Oral at bedtime  chlorhexidine 2% Cloths 1 Application(s) Topical <User Schedule>  chlorhexidine 4% Liquid 1 Application(s) Topical once  dapagliflozin 10 milliGRAM(s) Oral daily  dextrose 5%. 1000 milliLiter(s) (100 mL/Hr) IV Continuous <Continuous>  dextrose 5%. 1000 milliLiter(s) (50 mL/Hr) IV Continuous <Continuous>  dextrose 50% Injectable 25 Gram(s) IV Push once  dextrose 50% Injectable 12.5 Gram(s) IV Push once  dextrose 50% Injectable 25 Gram(s) IV Push once  gabapentin 300 milliGRAM(s) Oral three times a day  glucagon  Injectable 1 milliGRAM(s) IntraMuscular once  insulin glargine Injectable (LANTUS) 10 Unit(s) SubCutaneous at bedtime  insulin lispro (ADMELOG) corrective regimen sliding scale   SubCutaneous Before meals and at bedtime  levothyroxine 25 MICROGram(s) Oral daily  metoprolol succinate ER 25 milliGRAM(s) Oral daily  mupirocin 2% Nasal 1 Application(s) Both Nostrils two times a day  OLANZapine 10 milliGRAM(s) Oral at bedtime  polyethylene glycol 3350 17 Gram(s) Oral at bedtime  polyethylene glycol 3350 17 Gram(s) Oral daily  sacubitril 24 mG/valsartan 26 mG 1 Tablet(s) Oral two times a day  senna 2 Tablet(s) Oral at bedtime  sodium chloride 0.9% lock flush 3 milliLiter(s) IV Push every 8 hours  spironolactone 25 milliGRAM(s) Oral daily  tamsulosin 0.4 milliGRAM(s) Oral at bedtime  venlafaxine XR. 75 milliGRAM(s) Oral at bedtime    MEDICATIONS  (PRN):  acetaminophen     Tablet .. 650 milliGRAM(s) Oral every 6 hours PRN Temp greater or equal to 38C (100.4F), Mild Pain (1 - 3)  aluminum hydroxide/magnesium hydroxide/simethicone Suspension 30 milliLiter(s) Oral every 4 hours PRN Dyspepsia  dextrose Oral Gel 15 Gram(s) Oral once PRN Blood Glucose LESS THAN 70 milliGRAM(s)/deciliter  glycerin Suppository - Adult 1 Suppository(s) Rectal two times a day PRN Constipation  magnesium hydroxide Suspension 30 milliLiter(s) Oral daily PRN Constipation  melatonin 3 milliGRAM(s) Oral at bedtime PRN Insomnia  mineral oil enema 133 milliLiter(s) Rectal daily PRN constipation  nitroglycerin     SubLingual 0.4 milliGRAM(s) SubLingual every 5 minutes PRN Chest Pain  ondansetron Injectable 4 milliGRAM(s) IV Push every 8 hours PRN Nausea and/or Vomiting      Allergies    No Known Allergies      Vital Signs Last 24 Hrs  T(C): 36.3 (16 Apr 2025 06:09), Max: 36.5 (15 Apr 2025 20:25)  T(F): 97.4 (16 Apr 2025 06:09), Max: 97.7 (15 Apr 2025 20:25)  HR: 81 (16 Apr 2025 04:50) (76 - 82)  BP: 156/88 (16 Apr 2025 06:09) (156/87 - 160/96)  BP(mean): 118 (15 Apr 2025 20:25) (110 - 118)  RR: 18 (16 Apr 2025 06:09) (16 - 18)  SpO2: 95% (16 Apr 2025 06:09) (95% - 98%)    Parameters below as of 16 Apr 2025 06:09  Patient On (Oxygen Delivery Method): room air        PHYSICAL EXAM:    GENERAL: NAD  CHEST/LUNG: Clear to ausculation bilaterally  HEART: Regular rate and rhythm; S1 S2  ABDOMEN: Soft, Nontender,  Bowel sounds present  EXTREMITIES: no edema LLE   rt bka  NERVOUS SYSTEM:  Alert & Oriented X3    LABS:                        10.9   7.34  )-----------( 395      ( 16 Apr 2025 04:16 )             35.1                 CAPILLARY BLOOD GLUCOSE      POCT Blood Glucose.: 125 mg/dL (16 Apr 2025 12:00)  POCT Blood Glucose.: 115 mg/dL (16 Apr 2025 08:32)  POCT Blood Glucose.: 151 mg/dL (15 Apr 2025 21:41)  POCT Blood Glucose.: 103 mg/dL (15 Apr 2025 16:49)        RADIOLOGY & ADDITIONAL TESTS:

## 2025-04-16 NOTE — PROGRESS NOTE ADULT - ASSESSMENT
63 y/o male with NSTEMI, severe constipation, Hx. of PVD s/p BKA, CHF, HTN, HLD, Hypothyroidism, DM-2, Neuropathy, Schizoaffective disorder     NSTEMI:  hx of CAD with 3 stents > 3 years ago  s/p C mLAD severe stenosis outpt PCI to LAD as outpt after medical management  - started on Farxiga   -PRN SL NTG  -Aspirin/Statin/BB  - Entresto  -Cont. on tele  -OOB to chair    Severe constipation:  -Disimpacted in ED adn had bm  -miralax to BID  -PRN suppositories and enema  -Likely from percocet use for BKA    PVD s/p Right BKA:  -Wound clean, intact  -F/U with Dr. Roth post DC for eval/staple/suture removal  -No prosthesis yet, still in wheelchair    CHF:  -No evidence of decopensated CHF  -No prior echo on file  -cont. Entresto, BB    Loculated pleural effusion:  -Incidental   -CT noting small and without fever, hypoxia, pleurisy, SOB, or leucocytosis  -Never smoked  -O/P repeat imaging in 4-6 weeks     HTN:  -DASH diet when no longer NPO  -Resume o/p regimen    HLD:  -Statin     DM-2:  a1c 6.9%  -Basal coverage lantus 10units  -ISS    Neuropathy:  -Neurontin     Schizoaffective disorder:  -Denies SI/HI  -Cont. Zyprexa     Hypothyroisim  Elevated TSH 8, free t4 barely low at 0.8  -cont levothyroxine and recheck in 4-6 weeks    MRSA nares   - Bactroban       Disposition: dc to snf today

## 2025-04-16 NOTE — DISCHARGE NOTE NURSING/CASE MANAGEMENT/SOCIAL WORK - PATIENT PORTAL LINK FT
You can access the FollowMyHealth Patient Portal offered by [facility] by registering at the following website: [email or web address]. By joining [facility]’s FollowMyHealth portal, you will also be able to view your health information using other applications (apps) compatible with our system.